# Patient Record
Sex: FEMALE | Race: BLACK OR AFRICAN AMERICAN | NOT HISPANIC OR LATINO | Employment: FULL TIME | URBAN - METROPOLITAN AREA
[De-identification: names, ages, dates, MRNs, and addresses within clinical notes are randomized per-mention and may not be internally consistent; named-entity substitution may affect disease eponyms.]

---

## 2023-02-07 ENCOUNTER — HOSPITAL ENCOUNTER (OUTPATIENT)
Dept: DATA CONVERSION | Facility: HOSPITAL | Age: 33
End: 2023-02-07
Attending: OBSTETRICS & GYNECOLOGY
Payer: COMMERCIAL

## 2023-02-07 DIAGNOSIS — Z72.0 TOBACCO USE: ICD-10-CM

## 2023-02-07 DIAGNOSIS — M54.2 CERVICALGIA: ICD-10-CM

## 2023-02-07 DIAGNOSIS — O99.891 OTHER SPECIFIED DISEASES AND CONDITIONS COMPLICATING PREGNANCY (HHS-HCC): ICD-10-CM

## 2023-02-07 DIAGNOSIS — R10.9 UNSPECIFIED ABDOMINAL PAIN: ICD-10-CM

## 2023-02-07 DIAGNOSIS — O26.892 OTHER SPECIFIED PREGNANCY RELATED CONDITIONS, SECOND TRIMESTER (HHS-HCC): ICD-10-CM
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
## 2023-02-07 DIAGNOSIS — Z3A.26 26 WEEKS GESTATION OF PREGNANCY (HHS-HCC): ICD-10-CM

## 2023-02-07 DIAGNOSIS — Z34.80 ENCOUNTER FOR SUPERVISION OF OTHER NORMAL PREGNANCY, UNSPECIFIED TRIMESTER (HHS-HCC): ICD-10-CM

## 2023-02-07 LAB
ABO GROUP (TYPE) IN BLOOD: NORMAL
ACTIVATED PARTIAL THROMBOPLASTIN TIME IN PPP BY COAGULATION ASSAY: 27 SEC (ref 26–39)
ANTIBODY SCREEN: NORMAL
ERYTHROCYTE DISTRIBUTION WIDTH (RATIO) BY AUTOMATED COUNT: 15.1 % (ref 11.5–14.5)
ERYTHROCYTE MEAN CORPUSCULAR HEMOGLOBIN CONCENTRATION (G/DL) BY AUTOMATED: 32.2 G/DL (ref 32–36)
ERYTHROCYTE MEAN CORPUSCULAR VOLUME (FL) BY AUTOMATED COUNT: 88 FL (ref 80–100)
ERYTHROCYTES (10*6/UL) IN BLOOD BY AUTOMATED COUNT: 4.14 X10E12/L (ref 4–5.2)
FIBRINOGEN (MG/DL) IN PPP BY COAGULATION ASSAY: 682 MG/DL (ref 200–400)
HEMATOCRIT (%) IN BLOOD BY AUTOMATED COUNT: 36.3 % (ref 36–46)
HEMOGLOBIN (G/DL) IN BLOOD: 11.7 G/DL (ref 12–16)
INR IN PPP BY COAGULATION ASSAY: 0.9 (ref 0.9–1.1)
LEUKOCYTES (10*3/UL) IN BLOOD BY AUTOMATED COUNT: 8.9 X10E9/L (ref 4.4–11.3)
NRBC (PER 100 WBCS) BY AUTOMATED COUNT: 0 /100 WBC (ref 0–0)
PLATELETS (10*3/UL) IN BLOOD AUTOMATED COUNT: 294 X10E9/L (ref 150–450)
PROTHROMBIN TIME (PT) IN PPP BY COAGULATION ASSAY: 10.7 SEC (ref 9.8–13.4)
RH FACTOR: NORMAL

## 2023-02-08 LAB — Lab: 0 %

## 2023-02-28 LAB
ERYTHROCYTE DISTRIBUTION WIDTH (RATIO) BY AUTOMATED COUNT: 14.9 % (ref 11.5–14.5)
ERYTHROCYTE MEAN CORPUSCULAR HEMOGLOBIN CONCENTRATION (G/DL) BY AUTOMATED: 32.1 G/DL (ref 32–36)
ERYTHROCYTE MEAN CORPUSCULAR VOLUME (FL) BY AUTOMATED COUNT: 89 FL (ref 80–100)
ERYTHROCYTES (10*6/UL) IN BLOOD BY AUTOMATED COUNT: 3.82 X10E12/L (ref 4–5.2)
FERRITIN, PREGNANCY: 27 UG/L
FOLATE, SERUM, PREGNANCY: 12.6 NG/ML
GLUCOSE, 1 HR SCREEN, PREG: 202 MG/DL
HEMATOCRIT (%) IN BLOOD BY AUTOMATED COUNT: 34 % (ref 36–46)
HEMOGLOBIN (G/DL) IN BLOOD: 10.9 G/DL (ref 12–16)
IRON (UG/DL) IN SER/PLAS IN PREGNANCY: 68 UG/DL
IRON BINDING CAPACITY (UG/DL) IN PREGNANCY: 393 UG/DL
IRON SATURATION (%) IN PREGNANCY: 17 %
LEUKOCYTES (10*3/UL) IN BLOOD BY AUTOMATED COUNT: 8.2 X10E9/L (ref 4.4–11.3)
NRBC (PER 100 WBCS) BY AUTOMATED COUNT: 0 /100 WBC (ref 0–0)
PLATELETS (10*3/UL) IN BLOOD AUTOMATED COUNT: 274 X10E9/L (ref 150–450)
REFLEX ADDED, ANEMIA PANEL: ABNORMAL
SYPHILIS TOTAL AB: NONREACTIVE
VITAMIN B12, PREGNANCY: 368 PG/ML
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
## 2023-03-23 LAB
CLUE CELLS: ABNORMAL
NUGENT SCORE: 1
YEAST: PRESENT

## 2023-03-24 LAB
CHLAMYDIA TRACH., AMPLIFIED: NEGATIVE
N. GONORRHEA, AMPLIFIED: NEGATIVE
TRICHOMONAS VAGINALIS: NEGATIVE

## 2023-03-29 ENCOUNTER — HOSPITAL ENCOUNTER (OUTPATIENT)
Dept: DATA CONVERSION | Facility: HOSPITAL | Age: 33
End: 2023-03-29
Attending: OBSTETRICS & GYNECOLOGY
Payer: COMMERCIAL

## 2023-03-29 DIAGNOSIS — O24.414 GESTATIONAL DIABETES MELLITUS IN PREGNANCY, INSULIN CONTROLLED (HHS-HCC): ICD-10-CM

## 2023-03-29 DIAGNOSIS — O13.3 GESTATIONAL (PREGNANCY-INDUCED) HYPERTENSION WITHOUT SIGNIFICANT PROTEINURIA, THIRD TRIMESTER (HHS-HCC): ICD-10-CM

## 2023-03-29 DIAGNOSIS — Z34.80 ENCOUNTER FOR SUPERVISION OF OTHER NORMAL PREGNANCY, UNSPECIFIED TRIMESTER (HHS-HCC): ICD-10-CM

## 2023-03-29 DIAGNOSIS — O35.BXX0: ICD-10-CM

## 2023-03-29 DIAGNOSIS — E28.2 POLYCYSTIC OVARIAN SYNDROME: ICD-10-CM

## 2023-03-29 DIAGNOSIS — O99.283 ENDOCRINE, NUTRITIONAL AND METABOLIC DISEASES COMPLICATING PREGNANCY, THIRD TRIMESTER (HHS-HCC): ICD-10-CM

## 2023-03-29 DIAGNOSIS — Z79.4 LONG TERM (CURRENT) USE OF INSULIN (MULTI): ICD-10-CM

## 2023-03-29 DIAGNOSIS — O35.8XX0 MATERNAL CARE FOR OTHER (SUSPECTED) FETAL ABNORMALITY AND DAMAGE, NOT APPLICABLE OR UNSPECIFIED (HHS-HCC): ICD-10-CM

## 2023-03-29 DIAGNOSIS — Z87.891 PERSONAL HISTORY OF NICOTINE DEPENDENCE: ICD-10-CM

## 2023-03-29 DIAGNOSIS — Z79.82 LONG TERM (CURRENT) USE OF ASPIRIN: ICD-10-CM

## 2023-03-29 DIAGNOSIS — Z3A.33 33 WEEKS GESTATION OF PREGNANCY (HHS-HCC): ICD-10-CM

## 2023-03-29 LAB — POCT GLUCOSE: 91 MG/DL (ref 74–99)

## 2023-04-23 LAB — GROUP B STREP SCREEN: NORMAL

## 2023-04-29 LAB — SARS-COV-2 RESULT: NOT DETECTED

## 2023-05-09 ENCOUNTER — HOSPITAL ENCOUNTER (OUTPATIENT)
Dept: DATA CONVERSION | Facility: HOSPITAL | Age: 33
End: 2023-05-09
Attending: OBSTETRICS & GYNECOLOGY
Payer: COMMERCIAL

## 2023-05-09 DIAGNOSIS — Z72.0 TOBACCO USE: ICD-10-CM

## 2023-05-09 DIAGNOSIS — E66.9 OBESITY, UNSPECIFIED: ICD-10-CM

## 2023-05-09 DIAGNOSIS — R04.2 HEMOPTYSIS: ICD-10-CM

## 2023-05-09 DIAGNOSIS — R07.89 OTHER CHEST PAIN: ICD-10-CM

## 2023-05-09 DIAGNOSIS — E28.2 POLYCYSTIC OVARIAN SYNDROME: ICD-10-CM

## 2023-05-09 LAB
ACTIVATED PARTIAL THROMBOPLASTIN TIME IN PPP BY COAGULATION ASSAY: 29 SEC (ref 26–39)
ALANINE AMINOTRANSFERASE (SGPT) (U/L) IN SER/PLAS: 21 U/L (ref 7–45)
ALBUMIN (G/DL) IN SER/PLAS: 3.3 G/DL (ref 3.4–5)
ALKALINE PHOSPHATASE (U/L) IN SER/PLAS: 72 U/L (ref 33–110)
ANION GAP IN SER/PLAS: 14 MMOL/L (ref 10–20)
ASPARTATE AMINOTRANSFERASE (SGOT) (U/L) IN SER/PLAS: 12 U/L (ref 9–39)
BILIRUBIN TOTAL (MG/DL) IN SER/PLAS: 0.3 MG/DL (ref 0–1.2)
CALCIUM (MG/DL) IN SER/PLAS: 8.7 MG/DL (ref 8.6–10.6)
CARBON DIOXIDE, TOTAL (MMOL/L) IN SER/PLAS: 26 MMOL/L (ref 21–32)
CHLORIDE (MMOL/L) IN SER/PLAS: 106 MMOL/L (ref 98–107)
CREATININE (MG/DL) IN SER/PLAS: 0.96 MG/DL (ref 0.5–1.05)
ERYTHROCYTE DISTRIBUTION WIDTH (RATIO) BY AUTOMATED COUNT: 14.7 % (ref 11.5–14.5)
ERYTHROCYTE MEAN CORPUSCULAR HEMOGLOBIN CONCENTRATION (G/DL) BY AUTOMATED: 31.6 G/DL (ref 32–36)
ERYTHROCYTE MEAN CORPUSCULAR VOLUME (FL) BY AUTOMATED COUNT: 90 FL (ref 80–100)
ERYTHROCYTES (10*6/UL) IN BLOOD BY AUTOMATED COUNT: 4.14 X10E12/L (ref 4–5.2)
GFR FEMALE: 81 ML/MIN/1.73M2
GLUCOSE (MG/DL) IN SER/PLAS: 79 MG/DL (ref 74–99)
HEMATOCRIT (%) IN BLOOD BY AUTOMATED COUNT: 37.3 % (ref 36–46)
HEMOGLOBIN (G/DL) IN BLOOD: 11.8 G/DL (ref 12–16)
INR IN PPP BY COAGULATION ASSAY: 1 (ref 0.9–1.1)
LEUKOCYTES (10*3/UL) IN BLOOD BY AUTOMATED COUNT: 4.9 X10E9/L (ref 4.4–11.3)
NATRIURETIC PEPTIDE B (PG/ML) IN SER/PLAS: 123 PG/ML (ref 0–99)
NRBC (PER 100 WBCS) BY AUTOMATED COUNT: 0 /100 WBC (ref 0–0)
PLATELETS (10*3/UL) IN BLOOD AUTOMATED COUNT: 397 X10E9/L (ref 150–450)
POTASSIUM (MMOL/L) IN SER/PLAS: 4.3 MMOL/L (ref 3.5–5.3)
PROTEIN TOTAL: 6.3 G/DL (ref 6.4–8.2)
PROTHROMBIN TIME (PT) IN PPP BY COAGULATION ASSAY: 11.3 SEC (ref 9.8–13.4)
SODIUM (MMOL/L) IN SER/PLAS: 142 MMOL/L (ref 136–145)
TROPONIN I, HIGH SENSITIVITY: 3 NG/L (ref 0–34)
UREA NITROGEN (MG/DL) IN SER/PLAS: 16 MG/DL (ref 6–23)

## 2023-05-11 LAB
ATRIAL RATE: 54 BPM
P AXIS: 26 DEGREES
P OFFSET: 201 MS
P ONSET: 147 MS
PR INTERVAL: 148 MS
Q ONSET: 221 MS
QRS COUNT: 9 BEATS
QRS DURATION: 80 MS
QT INTERVAL: 418 MS
QTC CALCULATION(BAZETT): 396 MS
QTC FREDERICIA: 403 MS
R AXIS: -24 DEGREES
T AXIS: -1 DEGREES
T OFFSET: 430 MS
VENTRICULAR RATE: 54 BPM

## 2023-08-30 LAB — CHORIOGONADOTROPIN (MIU/ML) IN SER/PLAS: <3 IU/L

## 2023-08-31 LAB
CHLAMYDIA TRACH., AMPLIFIED: NEGATIVE
CLUE CELLS: NORMAL
N. GONORRHEA, AMPLIFIED: NEGATIVE
NUGENT SCORE: 1
YEAST: NORMAL

## 2023-09-06 VITALS
HEIGHT: 65 IN | WEIGHT: 260.14 LBS | BODY MASS INDEX: 43.34 KG/M2 | OXYGEN SATURATION: 100 % | HEART RATE: 91 BPM | DIASTOLIC BLOOD PRESSURE: 86 MMHG | RESPIRATION RATE: 17 BRPM | SYSTOLIC BLOOD PRESSURE: 123 MMHG | TEMPERATURE: 96.8 F

## 2023-09-07 VITALS — HEIGHT: 64 IN | WEIGHT: 260.14 LBS | BODY MASS INDEX: 44.41 KG/M2
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
## 2023-09-14 NOTE — PROGRESS NOTES
Current Stage:   Stage: Post-partum     Subjective Data:   Post Partum:  Ambulate: Yes   Flatus: Yes   Tolerate Diet: Yes   Lochia: Moderate   Postpartum:    Patient is a 33yo  7 days post  presenting with concern for hemoptysis and chest tightness.  Patient endorses pressure-like sensation in her midsternal  region since Saturday without obvious provocation, without radiation, without associated nausea, vomiting or diaphoresis.  Patient does endorse that it  feels a little like shortness of breath but endorses does not particularly worse with exertion or  when she takes a deep breath.  Patient additionally endorsing a single episode of bright red blood that she coughed up prior to presentation today.  Per picture patient shows me, volume was approximately 1 teaspoon.  Patient otherwise does not endorse  any other recent hemoptysis, denies sick contacts, denies fevers, malaise, flulike symptoms but does endorse some chills in the postpartum period.  Patient does endorse some bilateral lower extremity swelling that she thinks is worse on the right but  does not endorses particular tightness or pain in extremities.  Patient otherwise does not endorses headache, vision changes, or abdominal pain.      Pregnancy notable for:   - GDMA2, on insulin NPH   - Smoking cessation during pregnancy, was smoking 1-2 cigarettes/day, now about 1 q3 weeks  - Obesity, class III    OBHx:  2010  @ 40wga, 8#5  2011 TAB @ 19wga, twin gestation  2023  @ 38.2, 2.98kg  GynHx: denies h/o STIs, last Pap 3/2022 NILM, HPV neg  PMH: PCOS  PSH: D&E   Meds: acetaminophen  All: PCN  SocHx: 10year history of 2-3cigs/day prior to pregnancy,  no alcohol or recreational drug use      Objective Information:    Objective Information:      T   P  R  BP   MAP  SpO2   Value  36.4  59  18  138/69   94     Date/Time  15:36  15:36  15:36  15:36   15:36    Range  (36.4C - 36.4C )  (59 - 59 )  (18 - 18 )  (138  - 138 )/ (69 - 69 )  (94 - 94 )        Physical Exam:   Constitutional: well appearing, no apparent distress   ENMT: No visible blood in oropharynx   Head/Neck: NCAT   Respiratory/Thorax: Clear to auscultation bilaterally,  no increased work of breathing, satting 99% on room air.   Cardiovascular: Borderline bradycardic to 58 bpm,  2+ radial and PT pulses.  Regular rhythm.   Gastrointestinal: Appropriately post gravid/obese,  no tenderness to palpation.   Extremities: Bilateral pitting edema to upper calves.   Symmetric bilaterally, no tenderness to palpation, no appreciable tightness.   Neurological: Strength and sensation grossly intact.   Psychological: Appropriate mood and affect.     Assessment and Plan:   Assessment:    Patient is a 31yo  7 days post  presenting with concern for hemoptysis and chest tightness.  Given hemoptysis and chest tightness in postpartum period,  patient was evaluated with CT PE that was without acute embolus.  Patient otherwise with no infectious symptoms, no CT findings, overall well-appearing reducing concern for infectious causes of hemoptysis.  Patient otherwise with stable hemoglobin, no  evidence of continued hemoptysis and overall impression that hemoptysis may be secondary to bleed from other source versus mild tracheal irritation.  Patient additionally evaluated for cardiac causes of chest tightness with benign EKG, troponin of 3,   reducing concern for ACS or cardiac cause of symptoms.  Patient otherwise with no history of AVM or coagulopathy and coags here were within normal limits. Patient reevaluated with improvement in tightness, no respiratory distress and overall stable  appearance for follow-up outpatient.  Return precautions were discussed with patient, patient was confirmed to have follow-up with Dr. Perkins on Friday, and patient discharged home.      Patient seen and discussed with Dr. Kamari Norman MD, PhD  Emergency Medicine  PGY1      Attestation:   Note Completion:  I am a:  Resident/Fellow   Attending Attestation I saw and evaluated the patient.  I personally obtained the key and critical portions of the history and physical exam or was physically present for key and  critical portions performed by the resident/fellow. I reviewed the resident/fellow?s documentation and discussed the patient with the resident/fellow.  I agree with the resident/fellow?s medical decision making as documented in the note.     I personally evaluated the patient on 09-May-2023         Electronic Signatures:  Pj Norman (Resident))  (Signed 09-May-2023 19:39)   Authored: Current Stage, Subjective Data, Objective Data,  Assessment and Plan, Note Completion  Bala Benites)  (Signed 10-May-2023 06:55)   Authored: Note Completion   Co-Signer: Assessment and Plan, Note Completion      Last Updated: 10-May-2023 06:55 by Bala Benites)

## 2023-09-14 NOTE — PROGRESS NOTES
Current Stage:   Stage: Triage     Subjective Data:   Antepartum:  Vaginal Bleeding: No   Contractions/Abdominal Pain: No   Discharge/Loss of Fluid: No   Fetal Movement: Good   Fevers/Chills: No   Preeclampsia Symptoms: No   Antepartum:    31yo  at 33.3 wga by 11.2wk U/S who presents from the office for nrNST. Weekly NSTs d/t GDMA2. She is on insulin but missed dose this morning. Has not eaten anything today.  Reports good FM. Denies VB, LOF, or ctxs.    Pregnancy notable for:  - GDMA2 on Novolin 10 units daily   - last growth 3/6 EFW 1575 g 48%, AC 54%  - Tobacco use during pregnancy, quit 1/3  - fetal left pylectasis  - BMI 43.3    ObHx-    - gHTN  EAB   GynHx - h/o abnormal PAP-  ASCUS with neg HPV, repeat 3/2022- WNL, hx/o STIs and treated  MedHx - PCOS   SurgHx - denies   Fam Hx - non contributory  SocHx - stopped smoking tobacco 1/3/23, denies ETOH, denies illicit drugs   Meds - bASA (forgets often), Novolin   All - Penicillin (not tested, was told as a child)        Objective Information:    Objective Information:      T   P  R  BP   MAP  SpO2   Value  36.6  94  17  119/78   94  99%  Date/Time 3/29 11:06 3/29 11:06 3/29 11:06 3/29 11:06  3/29 11:06 3/29 11:05  Range  (36.6C - 36.6C )  (87 - 94 )  (17 - 17 )  (119 - 119 )/ (78 - 78 )  (94 - 94 )  (99% - 99% )      Pain reported at 3/29 11:06: 0 = None      Physical Exam:   Constitutional: alert, oriented   Obstetric: 135, mod variability, +accels, -decels  toco: mild irritability, abdomen soft   Head/Neck: neck supple, no thyromegaly   Respiratory/Thorax: normal respiratory effort   Gastrointestinal: soft, gravid, non-tender   Extremities: no calf tenderness or edema   Neurological: no deficits   Psychological: appropriate affect   Skin: no rashes or lesions      Testing:   NST Interpretation - Baby A:  ·  Baseline    ·  Variability moderate (amplitude range 6 to 25 bpm)   ·  Interpretation Reactive (2 15x15 accels)   ·   Accelerations +   ·  Decelerations -     Assessment and Plan:   Assessment:    31yo  at 33.3 wga by 11.2wk U/S who presents from the office for nrNST. Weekly NSTs d/t GDMA2. She is on insulin but missed dose this morning. Has not eaten anything today.  Reports good FM. Denies VB, LOF, or ctxs.    Prolonged monitoring  - sent to triage for nrNST in office  - reactive NST in triage today  - pt reports good FM  - continue twice weekly  testing     GDMA2  - POC BG 91  - missed insulin this morning, advised to take when she returns home    Maternal Well-being  - Vital signs stable and WNL  - Emotional support and reassurance provided  - All questions and concerns addressed    Dispo  - d/c to home, pt comfortable with this  - f/u 4/3 as scheduled with primary OB    Plan and tracing discussed with Dr. Chan who reviewed tracing and agrees with d/c home.    Shaye Hackett PA-C  Doc [employer]/Vocera        Plan of Care Reviewed With:  Plan of Care Reviewed With: patient     Attestation:   Note Completion:  I am a:  Advanced Practice Provider   Attending Only - Shared Visit with Advanced Practice Provider This is a shared visit.  I have reviewed the Advanced Practice Provider?s encounter note, approve the Advanced Practice Provider?s documentation,  and provide the following additional information from my personal encounter.    Comments/ Additional Findings    Patient seen. Agree with assessment and plan as documented.     Cassandra Chan MD  OB/GYN Attending Physician          Electronic Signatures:  Cassandra Chan)  (Signed 29-Mar-2023 13:36)   Authored: Note Completion   Co-Signer: Current Stage, Subjective Data, Objective Data,  Testing, Assessment and Plan, Note Completion  Shaye Hackett (PAC)  (Signed 29-Mar-2023 13:33)   Authored: Current Stage, Subjective Data, Objective Data,   Testing, Assessment and Plan, Note Completion      Last Updated: 29-Mar-2023 13:36 by Cassandra Chan)

## 2024-03-14 ENCOUNTER — OFFICE VISIT (OUTPATIENT)
Dept: OBSTETRICS AND GYNECOLOGY | Facility: CLINIC | Age: 34
End: 2024-03-14
Payer: COMMERCIAL

## 2024-03-14 VITALS
DIASTOLIC BLOOD PRESSURE: 100 MMHG | BODY MASS INDEX: 43.36 KG/M2 | WEIGHT: 254 LBS | HEIGHT: 64 IN | SYSTOLIC BLOOD PRESSURE: 120 MMHG

## 2024-03-14 DIAGNOSIS — L72.3 SEBACEOUS CYST OF RIGHT AXILLA: ICD-10-CM

## 2024-03-14 DIAGNOSIS — N94.6 MENSES PAINFUL: ICD-10-CM

## 2024-03-14 DIAGNOSIS — Z30.432 ENCOUNTER FOR IUD REMOVAL: ICD-10-CM

## 2024-03-14 DIAGNOSIS — Z11.3 SCREENING FOR STD (SEXUALLY TRANSMITTED DISEASE): ICD-10-CM

## 2024-03-14 DIAGNOSIS — Z75.8 DOES NOT HAVE PRIMARY CARE PROVIDER: ICD-10-CM

## 2024-03-14 DIAGNOSIS — Z01.419 WOMEN'S ANNUAL ROUTINE GYNECOLOGICAL EXAMINATION: Primary | ICD-10-CM

## 2024-03-14 DIAGNOSIS — R51.9 FREQUENT HEADACHES: ICD-10-CM

## 2024-03-14 PROCEDURE — 87205 SMEAR GRAM STAIN: CPT

## 2024-03-14 PROCEDURE — 1036F TOBACCO NON-USER: CPT

## 2024-03-14 PROCEDURE — 87800 DETECT AGNT MULT DNA DIREC: CPT

## 2024-03-14 PROCEDURE — 99395 PREV VISIT EST AGE 18-39: CPT

## 2024-03-14 PROCEDURE — 87661 TRICHOMONAS VAGINALIS AMPLIF: CPT

## 2024-03-14 RX ORDER — PHENYLPROPANOLAMINE/CLEMASTINE 75-1.34MG
200 TABLET, EXTENDED RELEASE ORAL EVERY 6 HOURS PRN
Qty: 120 CAPSULE | Refills: 0 | Status: SHIPPED | OUTPATIENT
Start: 2024-03-14

## 2024-03-14 ASSESSMENT — ENCOUNTER SYMPTOMS
PSYCHIATRIC NEGATIVE: 0
EYES NEGATIVE: 0
MUSCULOSKELETAL NEGATIVE: 0
NEUROLOGICAL NEGATIVE: 0
HEADACHES: 1
ALLERGIC/IMMUNOLOGIC NEGATIVE: 0
GASTROINTESTINAL NEGATIVE: 0
ENDOCRINE NEGATIVE: 0
CONSTITUTIONAL NEGATIVE: 0
HEMATOLOGIC/LYMPHATIC NEGATIVE: 0
RESPIRATORY NEGATIVE: 0
CARDIOVASCULAR NEGATIVE: 0

## 2024-03-14 ASSESSMENT — PAIN SCALES - GENERAL: PAINLEVEL: 0-NO PAIN

## 2024-03-14 NOTE — PROGRESS NOTES
Assessment/Plan   Diagnoses and all orders for this visit:  Women's annual routine gynecological examination  Encounter for IUD removal  Does not have primary care provider  -     Referral to Primary Care; Future  Screening for STD (sexually transmitted disease)  -     Vaginitis Gram Stain For Bacterial Vaginosis + Yeast  -     Trichomonas vaginalis, Amplified; Future  -     C. trachomatis + N. gonorrhoeae, Amplified; Future  -     Hepatitis B Surface Antibody; Future  -     Hepatitis B Surface Antigen; Future  -     Hepatitis C Antibody; Future  -     HIV 1/2 Antigen/Antibody Screen with Reflex to Confirmation; Future  -     Syphilis Screen with Reflex; Future  Menses painful  -     ibuprofen (Motrin) capsule; Take 1 capsule (200 mg) by mouth every 6 hours if needed (menstrual cramps.).  Sebaceous cyst of right axilla  -     Referral to Dermatology  Frequent headaches  -     Referral to Neurology; Future    Encouraged patient to follow up with PCP for hypertension and general health maintenance. Education provided regarding specialties in healthcare and the importance of PCP.   Encouraged patient to increase hydration and reviewed NSAID dosing and timing.   Pap Due 2027.   Discussed medication management for prolonged periods including POP. Patient declined at this time.   Encouraged to reach out to our office with any questions or concerns.   Encouraged patient to follow up annually or PRN    CODEY Barnett-CARO     Subjective   Jesusita Her is a 33 y.o. female who is here for a routine exam.     Concerns today:  IUD  Patient had IUD placed in September, would like it removed at this time. She reports that her partner is currently incarcerated and she does not feel the need for birth control at this time.     Prolonged periods  Patient reports regular monthly periods that are painful and last 2 weeks at a time. She reports a previous histroy of PCOS diagnosed at . Patient is requesting ibuprofen  prescription at this time.     Headaches  Patient reports frequent painful headaches lasting 3 days at a time that are debilitating.  Reports that she does not take any medication to help the headaches.   Patient reports inadequate hydration     Armpit Cyst  Patient reports concern for cyst in her right armpit.     STD Testing  Patient is requesting STD testing at this time.     No LMP recorded. Patient has had an implant.   Periods are irregular, lasting  2 weeks  patient reports that the periods are light but enough to wear a pad for 2 weeks.    Dysmenorrhea: mild, occurring throughout menses.   Cyclic symptoms include none.     Sexual Activity: not currently sexually active, male partners; Patient reports 1 partners in the last 12 months.  Pain with intercourse? No   Loss of desire? No   Able to have an orgasm? yes    History of prior STI: chlamydia and trichomonas  Current contraception: IUD  Last pap: 3/11/2022  History of abnormal Pap smear: yes - ascus in 2018  Family history of uterine or ovarian cancer: no  Last mammogram: no  History of abnormal mammogram: no  Family history of breast cancer: no  Past Medical History:   Diagnosis Date    Elevated blood-pressure reading, without diagnosis of hypertension 11/24/2021    Elevated blood pressure reading    Encounter for screening for infections with a predominantly sexual mode of transmission 08/07/2020    Routine screening for STI (sexually transmitted infection)    Encounter for screening for malignant neoplasm of cervix     Screening for cervical cancer    Furuncle, unspecified 03/11/2022    Boil    Hypertrophy of tonsils with hypertrophy of adenoids 04/17/2018    Tonsillar and adenoid hypertrophy    Irregular menstruation, unspecified 06/03/2020    Missed menses    Migraine, unspecified, not intractable, without status migrainosus 06/24/2020    Migraines    Nicotine dependence, cigarettes, uncomplicated 11/24/2021    Cigarette nicotine dependence without  "complication    Personal history of other diseases of the circulatory system     History of hypertension    Personal history of other diseases of the female genital tract 2020    History of amenorrhea    Personal history of other diseases of the respiratory system 2019    History of tonsillitis    Personal history of other infectious and parasitic diseases 2018    History of trichomoniasis    Personal history of other infectious and parasitic diseases     History of chlamydia infection    Personal history of other medical treatment     History of positive purified protein derivative test    Personal history of other specified conditions     History of snoring    Personal history of other specified conditions 2020    History of dysuria    Procedure and treatment not carried out for other reasons 2020    Attempted IUD removal, unsuccessful    Unspecified complication of genitourinary prosthetic device, implant and graft, initial encounter (CMS/[facility]) 2020    IUD complication      Past Surgical History:   Procedure Laterality Date    OTHER SURGICAL HISTORY  2018    Surgical Treatment For         Review of Systems   Genitourinary:  Positive for menstrual problem.   Neurological:  Positive for headaches.   All other systems reviewed and are negative.     Menstrual History:  OB History          3    Para   2    Term   2            AB   1    Living   2         SAB        IAB   1    Ectopic        Multiple        Live Births   2                Menarche age: 14  No LMP recorded. Patient has had an implant.       Objective   BP (!) 120/100 (BP Location: Right arm, Patient Position: Sitting, BP Cuff Size: Adult)   Ht 1.626 m (5' 4\")   Wt 115 kg (254 lb)   BMI 43.60 kg/m²     General:   Alert and oriented x 3   Heart:  Thyroid: Regular rate, rhythm  Euthyroid, normal shape and size   Lungs:  Breast: Clear to auscultation bilaterally  Symmetrical, no skin " changes/nipple discharge, redness, tenderness, no masses palpated bilaterally. Sebaceous cyst present in right axilla.    Abdomen: Soft, non tender   Vulva: EGBUS normal   Vagina: Pink, normal discharge   Cervix: No CMT. IUD strings visualized   Uterus: Normal shape, size   Adnexa: NT bilaterally     IUD Removal    Date/Time: 3/19/2024 11:35 AM    Performed by: BRI Barnett  Authorized by: BRI Barnett    Consent:     Consent obtained:  Written    Consent given by:  Patient    Procedure risks and benefits discussed: yes      Patient questions answered: yes      Patient agrees, verbalizes understanding, and wants to proceed: yes      Instructions and paperwork completed: yes    Universal protocol:     Patient states understanding of procedure being performed: yes    Procedure:     Removed with no complications: yes      Removal due to mechanical complications of IUD: no      Removal due to infection and inflammatory reaction: no

## 2024-03-15 ENCOUNTER — TELEPHONE (OUTPATIENT)
Dept: OBSTETRICS AND GYNECOLOGY | Facility: CLINIC | Age: 34
End: 2024-03-15
Payer: COMMERCIAL

## 2024-03-15 DIAGNOSIS — N76.0 BV (BACTERIAL VAGINOSIS): Primary | ICD-10-CM

## 2024-03-15 DIAGNOSIS — B96.89 BV (BACTERIAL VAGINOSIS): Primary | ICD-10-CM

## 2024-03-15 LAB
C TRACH RRNA SPEC QL NAA+PROBE: NEGATIVE
CLUE CELLS VAG LPF-#/AREA: PRESENT /[LPF]
N GONORRHOEA DNA SPEC QL PROBE+SIG AMP: NEGATIVE
NUGENT SCORE: 9
T VAGINALIS RRNA SPEC QL NAA+PROBE: NEGATIVE
YEAST VAG WET PREP-#/AREA: ABNORMAL

## 2024-03-15 RX ORDER — METRONIDAZOLE 500 MG/1
500 TABLET ORAL 2 TIMES DAILY
Qty: 14 TABLET | Refills: 0 | Status: SHIPPED | OUTPATIENT
Start: 2024-03-15 | End: 2024-03-22

## 2024-03-15 NOTE — TELEPHONE ENCOUNTER
Called patient to discuss results.  Identified by name and .  Informed patient of her results and medication that has been sent to pharmacy for treatment.  Patient verbalized understanding, no questions or concerns at this time.    MICHAEL Gonzalez RN        ----- Message from BRI Barnett sent at 3/15/2024  8:59 AM EDT -----  This patient tested positive for BV. I have placed the order for flagyl. Can someone call and let her know? Thanks!     Let me know if there are any questions.   BRI Barnett

## 2024-03-19 PROCEDURE — 58301 REMOVE INTRAUTERINE DEVICE: CPT

## 2024-06-05 ENCOUNTER — TELEMEDICINE (OUTPATIENT)
Dept: NEUROLOGY | Facility: CLINIC | Age: 34
End: 2024-06-05
Payer: COMMERCIAL

## 2024-06-05 DIAGNOSIS — R51.9 FREQUENT HEADACHES: ICD-10-CM

## 2024-06-05 DIAGNOSIS — G44.221 CHRONIC TENSION-TYPE HEADACHE, INTRACTABLE: Primary | ICD-10-CM

## 2024-06-05 PROCEDURE — 99204 OFFICE O/P NEW MOD 45 MIN: CPT | Performed by: NURSE PRACTITIONER

## 2024-06-05 RX ORDER — METOPROLOL TARTRATE 25 MG/1
25 TABLET, FILM COATED ORAL DAILY
Qty: 30 TABLET | Refills: 1 | Status: SHIPPED | OUTPATIENT
Start: 2024-06-05

## 2024-06-05 RX ORDER — SUMATRIPTAN SUCCINATE 100 MG/1
100 TABLET ORAL ONCE AS NEEDED
Qty: 9 TABLET | Refills: 1 | Status: SHIPPED | OUTPATIENT
Start: 2024-06-05

## 2024-06-05 NOTE — PROGRESS NOTES
Patient being assessed today for initial evaluation of headaches.  She reports that she has suffered from headaches since about 5 years ago after getting an IUD and now they just are becoming longer in duration.  She states that it is an ache that she experiences across her forehead.  This occurs on average of 1 time per week and can last anywhere from 1 day to 4 days.  Denies nausea.  Denies vomiting.  Denies photophobia.  Denies phonophobia.  Denies vision changes or aura.  Endorses occasional dizziness.  Denies speech and language deficits.  Would like to try her on metoprolol 25 mg nightly.  Will give her sumatriptan for abortive treatment as well although I believe this is more of a tension headache versus migraine.  Patient has been over utilizing over-the-counter's which may be contributing to it as well.  Discussed role of medicine, importance of taking medications, potential risks, benefits, and precautions to be taken.  Reviewed sleep hygiene and dietary modifications.  Follow-up in 6 to 8 weeks.    This note was created with voice recognition software and was not corrected for typographical or grammatical errors

## 2024-06-20 ENCOUNTER — APPOINTMENT (OUTPATIENT)
Dept: PRIMARY CARE | Facility: CLINIC | Age: 34
End: 2024-06-20
Payer: COMMERCIAL

## 2024-06-27 ENCOUNTER — APPOINTMENT (OUTPATIENT)
Dept: DERMATOLOGY | Facility: CLINIC | Age: 34
End: 2024-06-27
Payer: COMMERCIAL

## 2024-07-15 ENCOUNTER — APPOINTMENT (OUTPATIENT)
Dept: PRIMARY CARE | Facility: CLINIC | Age: 34
End: 2024-07-15
Payer: COMMERCIAL

## 2024-07-23 ENCOUNTER — APPOINTMENT (OUTPATIENT)
Dept: PRIMARY CARE | Facility: CLINIC | Age: 34
End: 2024-07-23
Payer: COMMERCIAL

## 2024-07-23 ENCOUNTER — LAB (OUTPATIENT)
Dept: LAB | Facility: LAB | Age: 34
End: 2024-07-23
Payer: COMMERCIAL

## 2024-07-23 VITALS
HEIGHT: 65 IN | HEART RATE: 73 BPM | SYSTOLIC BLOOD PRESSURE: 120 MMHG | WEIGHT: 251 LBS | TEMPERATURE: 97.8 F | DIASTOLIC BLOOD PRESSURE: 83 MMHG | BODY MASS INDEX: 41.82 KG/M2

## 2024-07-23 DIAGNOSIS — G43.001 MIGRAINE WITHOUT AURA AND WITH STATUS MIGRAINOSUS, NOT INTRACTABLE: ICD-10-CM

## 2024-07-23 DIAGNOSIS — I10 PRIMARY HYPERTENSION: ICD-10-CM

## 2024-07-23 DIAGNOSIS — L73.2 HIDRADENITIS SUPPURATIVA OF RIGHT AXILLA: ICD-10-CM

## 2024-07-23 DIAGNOSIS — Z76.89 ENCOUNTER TO ESTABLISH CARE: Primary | ICD-10-CM

## 2024-07-23 LAB
ALBUMIN SERPL BCP-MCNC: 3.9 G/DL (ref 3.4–5)
ALP SERPL-CCNC: 70 U/L (ref 33–110)
ALT SERPL W P-5'-P-CCNC: 17 U/L (ref 7–45)
ANION GAP SERPL CALC-SCNC: 10 MMOL/L (ref 10–20)
AST SERPL W P-5'-P-CCNC: 14 U/L (ref 9–39)
BILIRUB SERPL-MCNC: 0.4 MG/DL (ref 0–1.2)
BUN SERPL-MCNC: 16 MG/DL (ref 6–23)
CALCIUM SERPL-MCNC: 8.8 MG/DL (ref 8.6–10.6)
CHLORIDE SERPL-SCNC: 104 MMOL/L (ref 98–107)
CHOLEST SERPL-MCNC: 158 MG/DL (ref 0–199)
CHOLESTEROL/HDL RATIO: 3.3
CO2 SERPL-SCNC: 27 MMOL/L (ref 21–32)
CREAT SERPL-MCNC: 0.76 MG/DL (ref 0.5–1.05)
EGFRCR SERPLBLD CKD-EPI 2021: >90 ML/MIN/1.73M*2
ERYTHROCYTE [DISTWIDTH] IN BLOOD BY AUTOMATED COUNT: 14.1 % (ref 11.5–14.5)
EST. AVERAGE GLUCOSE BLD GHB EST-MCNC: 105 MG/DL
GLUCOSE SERPL-MCNC: 97 MG/DL (ref 74–99)
HBA1C MFR BLD: 5.3 %
HCT VFR BLD AUTO: 35.8 % (ref 36–46)
HDLC SERPL-MCNC: 48.3 MG/DL
HGB BLD-MCNC: 11.5 G/DL (ref 12–16)
LDLC SERPL CALC-MCNC: 96 MG/DL
MCH RBC QN AUTO: 25.8 PG (ref 26–34)
MCHC RBC AUTO-ENTMCNC: 32.1 G/DL (ref 32–36)
MCV RBC AUTO: 80 FL (ref 80–100)
NON HDL CHOLESTEROL: 110 MG/DL (ref 0–149)
NRBC BLD-RTO: 0 /100 WBCS (ref 0–0)
PLATELET # BLD AUTO: 271 X10*3/UL (ref 150–450)
POTASSIUM SERPL-SCNC: 4.2 MMOL/L (ref 3.5–5.3)
PROT SERPL-MCNC: 7.1 G/DL (ref 6.4–8.2)
RBC # BLD AUTO: 4.45 X10*6/UL (ref 4–5.2)
SODIUM SERPL-SCNC: 137 MMOL/L (ref 136–145)
TRIGL SERPL-MCNC: 71 MG/DL (ref 0–149)
TSH SERPL-ACNC: 1.1 MIU/L (ref 0.44–3.98)
VLDL: 14 MG/DL (ref 0–40)
WBC # BLD AUTO: 3 X10*3/UL (ref 4.4–11.3)

## 2024-07-23 PROCEDURE — 3074F SYST BP LT 130 MM HG: CPT | Performed by: INTERNAL MEDICINE

## 2024-07-23 PROCEDURE — 3008F BODY MASS INDEX DOCD: CPT | Performed by: INTERNAL MEDICINE

## 2024-07-23 PROCEDURE — 80053 COMPREHEN METABOLIC PANEL: CPT

## 2024-07-23 PROCEDURE — 3079F DIAST BP 80-89 MM HG: CPT | Performed by: INTERNAL MEDICINE

## 2024-07-23 PROCEDURE — 85027 COMPLETE CBC AUTOMATED: CPT

## 2024-07-23 PROCEDURE — 83036 HEMOGLOBIN GLYCOSYLATED A1C: CPT

## 2024-07-23 PROCEDURE — 36415 COLL VENOUS BLD VENIPUNCTURE: CPT

## 2024-07-23 PROCEDURE — 80061 LIPID PANEL: CPT

## 2024-07-23 PROCEDURE — 99203 OFFICE O/P NEW LOW 30 MIN: CPT | Performed by: INTERNAL MEDICINE

## 2024-07-23 PROCEDURE — 84443 ASSAY THYROID STIM HORMONE: CPT

## 2024-07-23 PROCEDURE — 4004F PT TOBACCO SCREEN RCVD TLK: CPT | Performed by: INTERNAL MEDICINE

## 2024-07-23 RX ORDER — BACITRACIN ZINC 500 UNIT/G
OINTMENT (GRAM) TOPICAL 2 TIMES DAILY
Qty: 14 G | Refills: 0 | Status: SHIPPED | OUTPATIENT
Start: 2024-07-23

## 2024-07-23 ASSESSMENT — ENCOUNTER SYMPTOMS
DIZZINESS: 0
FLANK PAIN: 0
SORE THROAT: 0
APPETITE CHANGE: 0
SHORTNESS OF BREATH: 0
WEAKNESS: 0
ACTIVITY CHANGE: 0
LIGHT-HEADEDNESS: 0
NECK PAIN: 0
ABDOMINAL PAIN: 0
FREQUENCY: 0
NERVOUS/ANXIOUS: 0
DIFFICULTY URINATING: 0
CHILLS: 0
BLOOD IN STOOL: 0
CHEST TIGHTNESS: 0
BACK PAIN: 0
DYSURIA: 0
HEADACHES: 1
UNEXPECTED WEIGHT CHANGE: 0
ARTHRALGIAS: 0
COUGH: 0
SLEEP DISTURBANCE: 0
DECREASED CONCENTRATION: 0
WHEEZING: 0
PALPITATIONS: 0

## 2024-07-23 NOTE — PROGRESS NOTES
"Subjective   Patient ID: Jesusita Her is a 33 y.o. female who presents for New Patient Visit (Pt present today to [facility]. ).    HPI Ms. Phillips is seen today for establishing care. Her medical history is significant for gestational diabetes, hypertension, migraine headaches, obesity. She is experiencing weight issues and has appointment with bariatric team. She is trying to eat better and has cut back on sugary drinks. She smokes daily but is willing to quit.  She lives with her children. Ages 13 and 14 month old    Review of Systems   Constitutional:  Negative for activity change, appetite change, chills and unexpected weight change.   HENT:  Negative for congestion, postnasal drip and sore throat.    Eyes:  Negative for visual disturbance.   Respiratory:  Negative for cough, chest tightness, shortness of breath and wheezing.    Cardiovascular:  Negative for chest pain, palpitations and leg swelling.   Gastrointestinal:  Negative for abdominal pain and blood in stool.   Endocrine: Negative for cold intolerance and heat intolerance.   Genitourinary:  Positive for vaginal discharge. Negative for difficulty urinating, dysuria, flank pain and frequency.   Musculoskeletal:  Negative for arthralgias, back pain, gait problem and neck pain.   Skin:  Negative for rash.   Allergic/Immunologic: Negative for food allergies.   Neurological:  Positive for headaches. Negative for dizziness, weakness and light-headedness.   Psychiatric/Behavioral:  Negative for decreased concentration and sleep disturbance. The patient is not nervous/anxious.        Objective   /83   Pulse 73   Temp 36.6 °C (97.8 °F)   Ht 1.651 m (5' 5\")   Wt 114 kg (251 lb)   BMI 41.77 kg/m²     Physical Exam  Vitals reviewed.   Constitutional:       General: She is not in acute distress.     Appearance: Normal appearance.   HENT:      Head: Normocephalic and atraumatic.      Mouth/Throat:      Mouth: Mucous membranes are moist.   Cardiovascular: "      Rate and Rhythm: Normal rate and regular rhythm.      Pulses: Normal pulses.   Pulmonary:      Effort: Pulmonary effort is normal. No respiratory distress.      Breath sounds: Normal breath sounds.   Abdominal:      General: Bowel sounds are normal. There is no distension.      Tenderness: There is no abdominal tenderness.   Musculoskeletal:         General: No swelling or tenderness. Normal range of motion.      Cervical back: Normal range of motion.   Skin:     General: Skin is warm.      Comments: Pustule in right axilla   Neurological:      General: No focal deficit present.      Mental Status: She is alert.      Coordination: Coordination normal.      Gait: Gait normal.   Psychiatric:         Mood and Affect: Mood normal.         Behavior: Behavior normal.         Assessment/Plan   Diagnoses and all orders for this visit:  Encounter to establish care  Comments:  Seen for establishing care  bloodwork ordered  encouraged her to quit smoking and eat healthy  Orders:  -     Referral to Primary Care  BMI 40.0-44.9, adult (Multi)  Comments:  Follow-up with bariatric or other weight management program  Orders:  -     Hemoglobin A1C; Future  Primary hypertension  Comments:  Well controlled without medications  has not started metoprolol yet  Orders:  -     CBC; Future  -     Comprehensive Metabolic Panel; Future  -     Lipid Panel; Future  -     TSH with reflex to Free T4 if abnormal; Future  Migraine without aura and with status migrainosus, not intractable  Comments:  Patient to take sumatriptan as prescribed for migraine headache  Hidradenitis suppurativa of right axilla  Comments:  Chronic hide rhinitis  appointment with dermatology  apply bacitracin topical  Orders:  -     bacitracin 500 unit/gram ointment; Apply topically 2 times a day.       Follow-up in six months or sooner if needed

## 2024-07-23 NOTE — PATIENT INSTRUCTIONS
Include plenty of fruits and vegetables as well as lean protein  Cut back on processed foods and added sugars  Maintain adequate water intake  Incorporate daily exercise or any form of physical activity for at least 30 minutes     Follow up in 6mths or sooner if needed

## 2024-07-25 ENCOUNTER — APPOINTMENT (OUTPATIENT)
Dept: PRIMARY CARE | Facility: CLINIC | Age: 34
End: 2024-07-25
Payer: COMMERCIAL

## 2024-08-06 NOTE — PROGRESS NOTES
"Subjective     Date: 8/6/2024 Time: 1:00 PM  Name: Jesusita Her  MRN: [id number]    This is a 33 y.o. female with morbid obesity (There is no height or weight on file to calculate BMI.) who presents to clinic for consideration of bariatric surgery. she has attempted and failed multiple diet and exercise regimens for weight loss. Initial Onset of obesity was {Initial Obesity Onset:18427:::0}.  Their goal for surgery is to  {Weight Loss Interest:60640}. The patient has tried multiple diets to lose weight including {Previous Diet Trials:62477}. The patient was most successful with the {Most Successful Diet:47133}. The most pounds lost on this diet were *** lbs. The patient considers their dietary weakness to be {Dietary Weakness:14715} The patient reports a  {Weight Pattern:26497::\"highest weight ever of *** pounds\",\"lowest weight ever of *** pounds\"} {Distribution of Obesity:56624}. Current diet: {Diet:95495}. Compliance: {Compliance:97119} Diet Problems: {Diet Problems:51145} } Dietary Details Include:{Dietary Details:18222} The patient {Exercise Frequency:50235} {Excercise Duration (Optional):58515} {Types of Exercise (Optional):69222}    {Comorbidities:00286}  Patient Active Problem List   Diagnosis    Chronic tension-type headache, intractable       {Procedure Preferred:61158}    {GERDQOL:82966}    PMH:   Past Medical History:   Diagnosis Date    Elevated blood-pressure reading, without diagnosis of hypertension 11/24/2021    Elevated blood pressure reading    Encounter for screening for infections with a predominantly sexual mode of transmission 08/07/2020    Routine screening for STI (sexually transmitted infection)    Encounter for screening for malignant neoplasm of cervix     Screening for cervical cancer    Furuncle, unspecified 03/11/2022    Boil    Hypertrophy of tonsils with hypertrophy of adenoids 04/17/2018    Tonsillar and adenoid hypertrophy    Irregular menstruation, unspecified 06/03/2020    Missed " menses    Migraine, unspecified, not intractable, without status migrainosus 2020    Migraines    Nicotine dependence, cigarettes, uncomplicated 2021    Cigarette nicotine dependence without complication    Personal history of other diseases of the circulatory system     History of hypertension    Personal history of other diseases of the female genital tract 2020    History of amenorrhea    Personal history of other diseases of the respiratory system 2019    History of tonsillitis    Personal history of other infectious and parasitic diseases 2018    History of trichomoniasis    Personal history of other infectious and parasitic diseases     History of chlamydia infection    Personal history of other medical treatment     History of positive purified protein derivative test    Personal history of other specified conditions     History of snoring    Personal history of other specified conditions 2020    History of dysuria    Procedure and treatment not carried out for other reasons 2020    Attempted IUD removal, unsuccessful    Unspecified complication of genitourinary prosthetic device, implant and graft, initial encounter (CMS-[facility]) 2020    IUD complication        PSH:   Past Surgical History:   Procedure Laterality Date    OTHER SURGICAL HISTORY  2018    Surgical Treatment For         STOPBANG *** (+ ***).   *** personal/family hx of VTE.    FAMILY HISTORY:  Family History   Problem Relation Name Age of Onset    Lung cancer Mother      Diabetes Father          SOCIAL HISTORY:  Social History     Tobacco Use    Smoking status: Every Day     Types: Cigarettes     Passive exposure: Never    Smokeless tobacco: Never   Substance Use Topics    Alcohol use: Yes     Alcohol/week: 1.0 standard drink of alcohol     Types: 1 Standard drinks or equivalent per week    Drug use: Never       MEDICATIONS:  Prior to Admission Medications:  Medication Documentation  Review Audit       Reviewed by Neo Dos Santos MD (Physician) on 07/23/24 at 0932      Medication Order Taking? Sig Documenting Provider Last Dose Status   ibuprofen (Motrin) capsule [id number]  Take 1 capsule (200 mg) by mouth every 6 hours if needed (menstrual cramps.). BRI Barnett  Active   metoprolol tartrate (Lopressor) 25 mg tablet [id number]  Take 1 tablet (25 mg) by mouth once daily. ALANA Finch  Active   SUMAtriptan (Imitrex) 100 mg tablet [id number]  Take 1 tablet (100 mg) by mouth 1 time if needed for migraine. ALANA Finch  Active                     ALLERGIES:  Allergies   Allergen Reactions    Penicillin Hives       REVIEW OF SYSTEMS:  GENERAL: Negative for malaise, significant weight loss and fever  HEAD: Negative for headache, swelling.  NECK: Negative for lumps, goiter, pain and significant neck swelling  RESPIRATORY: Negative for cough, wheezing or shortness of breath.  CARDIOVASCULAR: Negative for chest pain, leg swelling or palpitations.  GI: Negative for abdominal discomfort, blood in stools or black stools or change in bowel habits  : No history of dysuria, frequency or incontinence  MUSCULOSKELETAL: Negative for joint pain or swelling, back pain or muscle pain.  SKIN: Negative for lesions, rash, and itching.  PSYCH: Negative for sleep disturbance, mood disorder and recent psychosocial stressors.  ENDOCRINE: Negative for cold or heat intolerance, polyuria, polydipsia and goiter.    Objective   PHYSICAL EXAM:  Visit Vitals  OB Status Implant   Smoking Status Every Day     General appearance: obese, NAD  Neuro: AOx3  Head: EOMI; no swelling or lesions of scalp or face  ENT:  no lumps or lymphadenopathy, thyroid normal to palpation; oropharynx clear, no swelling or erythema  Skin: warm, no erythema or rashes  Lungs: clear to percussion and auscultation  Heart: regular rhythm and S1, S2 normal  Abdomen: soft, non-tender, no masses, no organomegaly  Extremities:  Normal exam of the extremities. No swelling or pain.  Psych: no hurried speech, no flight of ideas, normal affect    Assessment/Plan   IMPRESSION:  Jesusita Her is a 33 y.o. female with a BMI of There is no height or weight on file to calculate BMI. with the following diagnoses and co-morbidities:     Past Medical History:   Diagnosis Date    Elevated blood-pressure reading, without diagnosis of hypertension 11/24/2021    Elevated blood pressure reading    Encounter for screening for infections with a predominantly sexual mode of transmission 08/07/2020    Routine screening for STI (sexually transmitted infection)    Encounter for screening for malignant neoplasm of cervix     Screening for cervical cancer    Furuncle, unspecified 03/11/2022    Boil    Hypertrophy of tonsils with hypertrophy of adenoids 04/17/2018    Tonsillar and adenoid hypertrophy    Irregular menstruation, unspecified 06/03/2020    Missed menses    Migraine, unspecified, not intractable, without status migrainosus 06/24/2020    Migraines    Nicotine dependence, cigarettes, uncomplicated 11/24/2021    Cigarette nicotine dependence without complication    Personal history of other diseases of the circulatory system     History of hypertension    Personal history of other diseases of the female genital tract 08/07/2020    History of amenorrhea    Personal history of other diseases of the respiratory system 07/24/2019    History of tonsillitis    Personal history of other infectious and parasitic diseases 04/23/2018    History of trichomoniasis    Personal history of other infectious and parasitic diseases     History of chlamydia infection    Personal history of other medical treatment     History of positive purified protein derivative test    Personal history of other specified conditions     History of snoring    Personal history of other specified conditions 08/07/2020    History of dysuria    Procedure and treatment not carried out for  other reasons 02/17/2020    Attempted IUD removal, unsuccessful    Unspecified complication of genitourinary prosthetic device, implant and graft, initial encounter (CMS-[facility]) 02/09/2020    IUD complication       This patient does meet the criteria for a surgical weight loss procedure according to NIH guidelines.  The risks of sleeve gastrectomy, Terrell-en-Y gastric bypass, and duodenal switch surgery including but not limited to bleeding, leak along staple lines, infection, dehydration, ulcers, internal hernia, DVT/PE, pneumonia, myocardial infarction, prolonged nausea/vomiting, incomplete resolution of associated medical conditions, reflux, weight regain, vitamin/mineral deficiencies, and death have been explained to the patient and Jesusita Her has expressed understanding and acceptance of them.     We discussed the lifestyle changes necessary to be successful following surgery.    The increased risk of substance and alcohol abuse following bariatric surgery was discussed with the patient, along with the negative consequences of substance/alcohol use after surgery including addiction, worsening of mental health disorders, and injury to the stomach. The risk of smoking and vaping (tobacco or any other substance) after bariatric surgery was explained to the patient. This includes risk of anastamotic ulcers, gastritis, bleeding, perforation, stricture, and PO intolerance.  The patient expressed understanding and acceptance of these risks.    ***The patient was advised not to become pregnant within 12-18 months following bariatric surgery. She was educated on the increased risks to mother and fetus associated with pregnancy within 2 years of bariatric surgery.    The benefits of the above surgeries including weight loss, improvement/resolution of associated medical and mental health conditions, improved mobility, and decreased mortality have been explained the the patient and Jesusita Her has expressed  understanding and acceptance of them.      PLAN:  The plan of treatment for Jesusita Her is to continue with the consultations and tests ordered today in hopes of qualifying for pre-operative clearance for bariatric surgery. This includes:    Consult Nutrition for education   Consult Psychology  Consult Cardiology  Labs ordered  EGD  PCP for medical optimization  Consult sleep medicine - concern for JOSE  Recommend at least *** lbs of weight loss prior to surgery.  ***        *** minutes were spent with patient including history, physical exam, and education.

## 2024-08-13 NOTE — PROGRESS NOTES
"Subjective     Date: 8/13/2024 Time: 1:02 PM  Name: Jesusita Her  MRN: [id number]    This is a 33 y.o. female with morbid obesity (There is no height or weight on file to calculate BMI.) who presents to clinic for consideration of bariatric surgery. she has attempted and failed multiple diet and exercise regimens for weight loss. Initial Onset of obesity was {Initial Obesity Onset:30254:::0}.  Their goal for surgery is to  {Weight Loss Interest:88368}. The patient has tried multiple diets to lose weight including {Previous Diet Trials:13348}. The patient was most successful with the {Most Successful Diet:48806}. The most pounds lost on this diet were *** lbs. The patient considers their dietary weakness to be {Dietary Weakness:17498} The patient reports a  {Weight Pattern:02896::\"highest weight ever of *** pounds\",\"lowest weight ever of *** pounds\"} {Distribution of Obesity:86859}. Current diet: {Diet:30614}. Compliance: {Compliance:44784} Diet Problems: {Diet Problems:47331} } Dietary Details Include:{Dietary Details:18621} The patient {Exercise Frequency:93436} {Excercise Duration (Optional):69137} {Types of Exercise (Optional):26067}    {Comorbidities:30465}  Patient Active Problem List   Diagnosis    Chronic tension-type headache, intractable       {Procedure Preferred:67377}    {GERDQOL:30300}    PMH:   Past Medical History:   Diagnosis Date    Elevated blood-pressure reading, without diagnosis of hypertension 11/24/2021    Elevated blood pressure reading    Encounter for screening for infections with a predominantly sexual mode of transmission 08/07/2020    Routine screening for STI (sexually transmitted infection)    Encounter for screening for malignant neoplasm of cervix     Screening for cervical cancer    Furuncle, unspecified 03/11/2022    Boil    Hypertrophy of tonsils with hypertrophy of adenoids 04/17/2018    Tonsillar and adenoid hypertrophy    Irregular menstruation, unspecified 06/03/2020    " Missed menses    Migraine, unspecified, not intractable, without status migrainosus 2020    Migraines    Nicotine dependence, cigarettes, uncomplicated 2021    Cigarette nicotine dependence without complication    Personal history of other diseases of the circulatory system     History of hypertension    Personal history of other diseases of the female genital tract 2020    History of amenorrhea    Personal history of other diseases of the respiratory system 2019    History of tonsillitis    Personal history of other infectious and parasitic diseases 2018    History of trichomoniasis    Personal history of other infectious and parasitic diseases     History of chlamydia infection    Personal history of other medical treatment     History of positive purified protein derivative test    Personal history of other specified conditions     History of snoring    Personal history of other specified conditions 2020    History of dysuria    Procedure and treatment not carried out for other reasons 2020    Attempted IUD removal, unsuccessful    Unspecified complication of genitourinary prosthetic device, implant and graft, initial encounter (CMS-HCC) 2020    IUD complication        PSH:   Past Surgical History:   Procedure Laterality Date    OTHER SURGICAL HISTORY  2018    Surgical Treatment For         STOPBANG *** (+ ***).   *** personal/family hx of VTE.    FAMILY HISTORY:  Family History   Problem Relation Name Age of Onset    Lung cancer Mother      Diabetes Father          SOCIAL HISTORY:  Social History     Tobacco Use    Smoking status: Every Day     Types: Cigarettes     Passive exposure: Never    Smokeless tobacco: Never   Substance Use Topics    Alcohol use: Yes     Alcohol/week: 1.0 standard drink of alcohol     Types: 1 Standard drinks or equivalent per week    Drug use: Never       MEDICATIONS:  Prior to Admission Medications:  Medication  Documentation Review Audit       Reviewed by Neo Dos Santos MD (Physician) on 07/23/24 at 0932      Medication Order Taking? Sig Documenting Provider Last Dose Status   ibuprofen (Motrin) capsule [id number]  Take 1 capsule (200 mg) by mouth every 6 hours if needed (menstrual cramps.). CODEY Barnett-CARO  Active   metoprolol tartrate (Lopressor) 25 mg tablet [id number]  Take 1 tablet (25 mg) by mouth once daily. CODEY Finch-CNP  Active   SUMAtriptan (Imitrex) 100 mg tablet [id number]  Take 1 tablet (100 mg) by mouth 1 time if needed for migraine. CODEY Finch-ALMA ROSA  Active                     ALLERGIES:  Allergies   Allergen Reactions    Penicillin Hives       REVIEW OF SYSTEMS:  GENERAL: Negative for malaise, significant weight loss and fever  HEAD: Negative for headache, swelling.  NECK: Negative for lumps, goiter, pain and significant neck swelling  RESPIRATORY: Negative for cough, wheezing or shortness of breath.  CARDIOVASCULAR: Negative for chest pain, leg swelling or palpitations.  GI: Negative for abdominal discomfort, blood in stools or black stools or change in bowel habits  : No history of dysuria, frequency or incontinence  MUSCULOSKELETAL: Negative for joint pain or swelling, back pain or muscle pain.  SKIN: Negative for lesions, rash, and itching.  PSYCH: Negative for sleep disturbance, mood disorder and recent psychosocial stressors.  ENDOCRINE: Negative for cold or heat intolerance, polyuria, polydipsia and goiter.    Objective   PHYSICAL EXAM:  Visit Vitals  OB Status Implant   Smoking Status Every Day     General appearance: obese, NAD  Neuro: AOx3  Head: EOMI; no swelling or lesions of scalp or face  ENT:  no lumps or lymphadenopathy, thyroid normal to palpation; oropharynx clear, no swelling or erythema  Skin: warm, no erythema or rashes  Lungs: clear to percussion and auscultation  Heart: regular rhythm and S1, S2 normal  Abdomen: soft, non-tender, no masses, no  organomegaly  Extremities: Normal exam of the extremities. No swelling or pain.  Psych: no hurried speech, no flight of ideas, normal affect    Assessment/Plan   IMPRESSION:  Jesusita Her is a 33 y.o. female with a BMI of There is no height or weight on file to calculate BMI. with the following diagnoses and co-morbidities:     Past Medical History:   Diagnosis Date    Elevated blood-pressure reading, without diagnosis of hypertension 11/24/2021    Elevated blood pressure reading    Encounter for screening for infections with a predominantly sexual mode of transmission 08/07/2020    Routine screening for STI (sexually transmitted infection)    Encounter for screening for malignant neoplasm of cervix     Screening for cervical cancer    Furuncle, unspecified 03/11/2022    Boil    Hypertrophy of tonsils with hypertrophy of adenoids 04/17/2018    Tonsillar and adenoid hypertrophy    Irregular menstruation, unspecified 06/03/2020    Missed menses    Migraine, unspecified, not intractable, without status migrainosus 06/24/2020    Migraines    Nicotine dependence, cigarettes, uncomplicated 11/24/2021    Cigarette nicotine dependence without complication    Personal history of other diseases of the circulatory system     History of hypertension    Personal history of other diseases of the female genital tract 08/07/2020    History of amenorrhea    Personal history of other diseases of the respiratory system 07/24/2019    History of tonsillitis    Personal history of other infectious and parasitic diseases 04/23/2018    History of trichomoniasis    Personal history of other infectious and parasitic diseases     History of chlamydia infection    Personal history of other medical treatment     History of positive purified protein derivative test    Personal history of other specified conditions     History of snoring    Personal history of other specified conditions 08/07/2020    History of dysuria    Procedure and  treatment not carried out for other reasons 02/17/2020    Attempted IUD removal, unsuccessful    Unspecified complication of genitourinary prosthetic device, implant and graft, initial encounter (CMS-[facility]) 02/09/2020    IUD complication       This patient does meet the criteria for a surgical weight loss procedure according to NIH guidelines.  The risks of sleeve gastrectomy, Terrell-en-Y gastric bypass, and duodenal switch surgery including but not limited to bleeding, leak along staple lines, infection, dehydration, ulcers, internal hernia, DVT/PE, pneumonia, myocardial infarction, prolonged nausea/vomiting, incomplete resolution of associated medical conditions, reflux, weight regain, vitamin/mineral deficiencies, and death have been explained to the patient and Jesusita Her has expressed understanding and acceptance of them.     We discussed the lifestyle changes necessary to be successful following surgery.    The increased risk of substance and alcohol abuse following bariatric surgery was discussed with the patient, along with the negative consequences of substance/alcohol use after surgery including addiction, worsening of mental health disorders, and injury to the stomach. The risk of smoking and vaping (tobacco or any other substance) after bariatric surgery was explained to the patient. This includes risk of anastamotic ulcers, gastritis, bleeding, perforation, stricture, and PO intolerance.  The patient expressed understanding and acceptance of these risks.    ***The patient was advised not to become pregnant within 12-18 months following bariatric surgery. She was educated on the increased risks to mother and fetus associated with pregnancy within 2 years of bariatric surgery.    The benefits of the above surgeries including weight loss, improvement/resolution of associated medical and mental health conditions, improved mobility, and decreased mortality have been explained the the patient and Jesusita  Arden has expressed understanding and acceptance of them.      PLAN:  The plan of treatment for Jesusita Her is to continue with the consultations and tests ordered today in hopes of qualifying for pre-operative clearance for bariatric surgery. This includes:    Consult Nutrition for education  Consult Psychology  Consult Cardiology  Labs ordered  EGD  PCP for medical optimization  Consult sleep medicine - concern for JOSE  Recommend at least *** lbs of weight loss prior to surgery.  ***        *** minutes were spent with patient including history, physical exam, and education.

## 2024-08-14 ENCOUNTER — HOSPITAL ENCOUNTER (EMERGENCY)
Facility: HOSPITAL | Age: 34
Discharge: HOME | End: 2024-08-14
Payer: COMMERCIAL

## 2024-08-14 VITALS
DIASTOLIC BLOOD PRESSURE: 89 MMHG | SYSTOLIC BLOOD PRESSURE: 130 MMHG | HEIGHT: 66 IN | WEIGHT: 253 LBS | RESPIRATION RATE: 16 BRPM | BODY MASS INDEX: 40.66 KG/M2 | TEMPERATURE: 96.5 F | HEART RATE: 78 BPM | OXYGEN SATURATION: 97 %

## 2024-08-14 DIAGNOSIS — G43.909 MIGRAINE WITHOUT STATUS MIGRAINOSUS, NOT INTRACTABLE, UNSPECIFIED MIGRAINE TYPE: Primary | ICD-10-CM

## 2024-08-14 LAB — PREGNANCY TEST URINE, POC: NEGATIVE

## 2024-08-14 PROCEDURE — 99284 EMERGENCY DEPT VISIT MOD MDM: CPT

## 2024-08-14 PROCEDURE — 2500000004 HC RX 250 GENERAL PHARMACY W/ HCPCS (ALT 636 FOR OP/ED): Mod: SE

## 2024-08-14 PROCEDURE — 96375 TX/PRO/DX INJ NEW DRUG ADDON: CPT

## 2024-08-14 PROCEDURE — 96374 THER/PROPH/DIAG INJ IV PUSH: CPT | Mod: 59

## 2024-08-14 PROCEDURE — 96361 HYDRATE IV INFUSION ADD-ON: CPT

## 2024-08-14 PROCEDURE — 81025 URINE PREGNANCY TEST: CPT

## 2024-08-14 RX ORDER — KETOROLAC TROMETHAMINE 30 MG/ML
30 INJECTION, SOLUTION INTRAMUSCULAR; INTRAVENOUS ONCE
Status: COMPLETED | OUTPATIENT
Start: 2024-08-14 | End: 2024-08-14

## 2024-08-14 RX ORDER — METOCLOPRAMIDE HYDROCHLORIDE 5 MG/ML
10 INJECTION INTRAMUSCULAR; INTRAVENOUS ONCE
Status: COMPLETED | OUTPATIENT
Start: 2024-08-14 | End: 2024-08-14

## 2024-08-14 RX ORDER — DIPHENHYDRAMINE HYDROCHLORIDE 50 MG/ML
25 INJECTION INTRAMUSCULAR; INTRAVENOUS ONCE
Status: COMPLETED | OUTPATIENT
Start: 2024-08-14 | End: 2024-08-14

## 2024-08-14 ASSESSMENT — COLUMBIA-SUICIDE SEVERITY RATING SCALE - C-SSRS
1. IN THE PAST MONTH, HAVE YOU WISHED YOU WERE DEAD OR WISHED YOU COULD GO TO SLEEP AND NOT WAKE UP?: NO
6. HAVE YOU EVER DONE ANYTHING, STARTED TO DO ANYTHING, OR PREPARED TO DO ANYTHING TO END YOUR LIFE?: NO
2. HAVE YOU ACTUALLY HAD ANY THOUGHTS OF KILLING YOURSELF?: NO

## 2024-08-14 ASSESSMENT — PAIN SCALES - GENERAL: PAINLEVEL_OUTOF10: 9

## 2024-08-14 ASSESSMENT — PAIN - FUNCTIONAL ASSESSMENT: PAIN_FUNCTIONAL_ASSESSMENT: 0-10

## 2024-08-14 NOTE — ED TRIAGE NOTES
Migraine x 4 days. Has taken tylenol and excedrin with no relief. Has hx of migraines and states doesn't normally last this long. Has pressure behind eyes and tension in back of head and neck

## 2024-08-14 NOTE — ED PROVIDER NOTES
HPI   Chief Complaint   Patient presents with   • Headache   This is a 33-year-old female with a past medical history significant for hypertension, PCOS and migraines who presents to the ED with a headache.  Patient states that for the past 4 days she has had pressure-like pain in her forehead and behind her eyes, rated 8/10.  Denies any photophobia or phonophobia.  Denies any falls or traumas.  She states that her symptoms feel like her typical migraine pain, however the pain has never persisted this long.  She has taken Excedrin, Tylenol and Aleve at home, which she states has stopped working.  She denies any sudden onset or this being the worst headache of her life.     Denies any fevers, chills, dizziness, confusion, neck pain or rigidity, chest pain, shortness of breath     Limitations to history: None  Independent Historians: Patient  External Records Reviewed: Prior ED note    Patient History   Past Medical History:   Diagnosis Date   • Elevated blood-pressure reading, without diagnosis of hypertension 11/24/2021    Elevated blood pressure reading   • Encounter for screening for infections with a predominantly sexual mode of transmission 08/07/2020    Routine screening for STI (sexually transmitted infection)   • Encounter for screening for malignant neoplasm of cervix     Screening for cervical cancer   • Furuncle, unspecified 03/11/2022    Boil   • Hypertrophy of tonsils with hypertrophy of adenoids 04/17/2018    Tonsillar and adenoid hypertrophy   • Irregular menstruation, unspecified 06/03/2020    Missed menses   • Migraine, unspecified, not intractable, without status migrainosus 06/24/2020    Migraines   • Nicotine dependence, cigarettes, uncomplicated 11/24/2021    Cigarette nicotine dependence without complication   • Personal history of other diseases of the circulatory system     History of hypertension   • Personal history of other diseases of the female genital tract 08/07/2020    History of  amenorrhea   • Personal history of other diseases of the respiratory system 2019    History of tonsillitis   • Personal history of other infectious and parasitic diseases 2018    History of trichomoniasis   • Personal history of other infectious and parasitic diseases     History of chlamydia infection   • Personal history of other medical treatment     History of positive purified protein derivative test   • Personal history of other specified conditions     History of snoring   • Personal history of other specified conditions 2020    History of dysuria   • Procedure and treatment not carried out for other reasons 2020    Attempted IUD removal, unsuccessful   • Unspecified complication of genitourinary prosthetic device, implant and graft, initial encounter (CMS-HCC) 2020    IUD complication     Past Surgical History:   Procedure Laterality Date   • OTHER SURGICAL HISTORY  2018    Surgical Treatment For      Family History   Problem Relation Name Age of Onset   • Lung cancer Mother     • Diabetes Father       Social History     Tobacco Use   • Smoking status: Every Day     Types: Cigarettes     Passive exposure: Never   • Smokeless tobacco: Never   Substance Use Topics   • Alcohol use: Yes     Alcohol/week: 1.0 standard drink of alcohol     Types: 1 Standard drinks or equivalent per week   • Drug use: Never       Physical Exam   ED Triage Vitals [24 1214]   Temperature Pulse Respirations BP   35.8 °C (96.5 °F) -- 16 (!) 142/102      Pulse Ox Temp src Heart Rate Source Patient Position   100 % -- -- --      BP Location FiO2 (%)     -- --       Physical Exam  Constitutional:       General: She is not in acute distress.     Appearance: She is obese. She is not ill-appearing or diaphoretic.   HENT:      Head: Normocephalic and atraumatic.   Cardiovascular:      Rate and Rhythm: Normal rate and regular rhythm.      Heart sounds: Normal heart sounds.   Pulmonary:       Effort: Pulmonary effort is normal. No respiratory distress.      Breath sounds: Normal breath sounds.   Abdominal:      General: Bowel sounds are normal.      Palpations: Abdomen is soft.      Tenderness: There is no abdominal tenderness.   Musculoskeletal:      Cervical back: Normal range of motion and neck supple. No rigidity or tenderness.   Lymphadenopathy:      Cervical: No cervical adenopathy.   Neurological:      Mental Status: She is alert.         ED Course & MDM   ED Course as of 08/14/24 1326   Wed Aug 14, 2024   1259 POCT pregnancy, urine  Negative [LH]      ED Course User Index  [LH] Nita Villarreal PA-C                 Medical Decision Making  This is a 33-year-old female with a past medical history significant for hypertension, PCOS and migraines who presents to the ED with a headache.  Patient states that for the past 4 days she has had pressure-like pain in her forehead and behind her eyes, rated 8/10.  Denies any photophobia or phonophobia.     On physical exam, patient is overall well-appearing, toxic and in no acute distress.  Head is normocephalic and atraumatic.  Neck is supple with full, non tender ROM and no cervical lymphadenopathy.  Patient is A&Ox3 with no focal neurological deficits.  She was observed to ambulate in the ED, unassisted with a stable gait and normal coordination.     Patient is febrile, well-appearing with no confusion or meningeal signs, low suspicion for meningitis or encephalitis.  She denies any falls or injuries, denies any sudden onset or this being the worst headache of her life, low suspicion for acute intercranial hemorrhage.  Administered Toradol, Reglan, Benadryl and IV fluids as a headache cocktail.  On reassessment, patient states her pain has improved, she is feeling better and she would like to go home.  Discussed ED return criteria.  Counseled patient to follow-up with her PCP.  Patient verbalized understanding and was agreeable plan of care.  Discharged in  stable condition.

## 2024-08-14 NOTE — ED PROVIDER NOTES
HPI   Chief Complaint   Patient presents with    Headache   This is a 33-year-old female with a past medical history significant for hypertension who presents to the ED for a headache.  Patient states that for the past 4 days she has been experiencing pressure-like pain in her forehead and behind both of her eyes rated 8/10.  Endorses mild photophobia without any phonophobia. Denies any visual disturbances.  Denies any falls or traumas.  She has taken Excedrin, Tylenol and Aleve for her symptoms, she states that they have stopped working.  Reports that her pain feels similar to her prior migraines, however her symptoms do not normally last this long.  Denies any sudden onset or this being the worst headache of her life.     Denies any fevers, chills, dizziness, chest pain, shortness of breath, abdominal pain, N/V/D      Limitations to history: None  Independent Historians: Patient  External Records Reviewed: None    Patient History   Past Medical History:   Diagnosis Date    Elevated blood-pressure reading, without diagnosis of hypertension 11/24/2021    Elevated blood pressure reading    Encounter for screening for infections with a predominantly sexual mode of transmission 08/07/2020    Routine screening for STI (sexually transmitted infection)    Encounter for screening for malignant neoplasm of cervix     Screening for cervical cancer    Furuncle, unspecified 03/11/2022    Boil    Hypertrophy of tonsils with hypertrophy of adenoids 04/17/2018    Tonsillar and adenoid hypertrophy    Irregular menstruation, unspecified 06/03/2020    Missed menses    Migraine, unspecified, not intractable, without status migrainosus 06/24/2020    Migraines    Nicotine dependence, cigarettes, uncomplicated 11/24/2021    Cigarette nicotine dependence without complication    Personal history of other diseases of the circulatory system     History of hypertension    Personal history of other diseases of the female genital tract  2020    History of amenorrhea    Personal history of other diseases of the respiratory system 2019    History of tonsillitis    Personal history of other infectious and parasitic diseases 2018    History of trichomoniasis    Personal history of other infectious and parasitic diseases     History of chlamydia infection    Personal history of other medical treatment     History of positive purified protein derivative test    Personal history of other specified conditions     History of snoring    Personal history of other specified conditions 2020    History of dysuria    Procedure and treatment not carried out for other reasons 2020    Attempted IUD removal, unsuccessful    Unspecified complication of genitourinary prosthetic device, implant and graft, initial encounter (CMS-HCC) 2020    IUD complication     Past Surgical History:   Procedure Laterality Date    OTHER SURGICAL HISTORY  2018    Surgical Treatment For      Family History   Problem Relation Name Age of Onset    Lung cancer Mother      Diabetes Father       Social History     Tobacco Use    Smoking status: Every Day     Types: Cigarettes     Passive exposure: Never    Smokeless tobacco: Never   Substance Use Topics    Alcohol use: Yes     Alcohol/week: 1.0 standard drink of alcohol     Types: 1 Standard drinks or equivalent per week    Drug use: Never       Physical Exam   ED Triage Vitals   Temperature Heart Rate Respirations BP   24 1214 24 1303 24 1214 24 1214   35.8 °C (96.5 °F) 78 16 (!) 142/102      Pulse Ox Temp src Heart Rate Source Patient Position   24 1214 -- 24 1303 --   100 %  Monitor       BP Location FiO2 (%)     -- --             Physical Exam  Constitutional:       General: She is not in acute distress.     Appearance: She is not ill-appearing.   HENT:      Head: Normocephalic and atraumatic.   Eyes:      Extraocular Movements: Extraocular movements  intact.      Pupils: Pupils are equal, round, and reactive to light.   Cardiovascular:      Rate and Rhythm: Normal rate and regular rhythm.      Heart sounds: Normal heart sounds.   Pulmonary:      Effort: No respiratory distress.      Breath sounds: Normal breath sounds.   Abdominal:      General: Bowel sounds are normal.      Palpations: Abdomen is soft.      Tenderness: There is no abdominal tenderness.   Musculoskeletal:         General: Normal range of motion.      Cervical back: Normal range of motion and neck supple. No rigidity or tenderness. No pain with movement.   Lymphadenopathy:      Cervical: No cervical adenopathy.   Skin:     General: Skin is warm and dry.   Neurological:      General: No focal deficit present.      Mental Status: She is alert and oriented to person, place, and time.      Motor: No weakness.      Coordination: Coordination normal.      Gait: Gait normal.         ED Course & MDM   ED Course as of 08/14/24 1535   Wed Aug 14, 2024   1259 POCT pregnancy, urine  Negative [LH]      ED Course User Index  [LH] Nita Villarreal PA-C         Diagnoses as of 08/14/24 1535   Migraine without status migrainosus, not intractable, unspecified migraine type         Medical Decision Making  This is a 33-year-old female with a past medical history significant for hypertension who presents to the ED for a headache.  Patient states that for the past 4 days she has been experiencing pressure-like pain in her forehead and behind both of her eyes rated 8/10.  Endorses mild photophobia without any phonophobia.    On physical exam, patient is overall well-appearing and in no acute distress.  Head is normocephalic and atraumatic.  Neck is supple with full, non tender, ROM.  Patient is A&Ox3 with no focal neurological deficits.  She was observed to ambulate in the ED, unassisted with a stable gait.  No weakness or abnormal coordination.    Patient denies any falls or traumas, denies any sudden onset or this  being the worst headache of her life, low suspicion for intracranial hemorrhage. Patient is afebrile without any confusion or meningeal signs, low suspicion for meningitis or encephalitis.   Administered IV fluids, Toradol, Reglan and Benadryl as a headache cocktail.  On reassessment, patient states that her headache has completely resolved and she is ready to go home. Provided an outpatient referral to neurology for follow up about her migraines. Discussed ED return criteria.  Patient verbalized understanding and was agreeable to plan of care.  Discharged in stable condition.            Nita Villarreal PA-C  08/14/24 [id number]

## 2024-08-16 ENCOUNTER — APPOINTMENT (OUTPATIENT)
Dept: SURGERY | Facility: CLINIC | Age: 34
End: 2024-08-16
Payer: COMMERCIAL

## 2024-08-16 ENCOUNTER — HOSPITAL ENCOUNTER (EMERGENCY)
Facility: HOSPITAL | Age: 34
Discharge: HOME | End: 2024-08-16
Attending: EMERGENCY MEDICINE
Payer: COMMERCIAL

## 2024-08-16 ENCOUNTER — APPOINTMENT (OUTPATIENT)
Dept: RADIOLOGY | Facility: HOSPITAL | Age: 34
End: 2024-08-16
Payer: COMMERCIAL

## 2024-08-16 VITALS
HEART RATE: 79 BPM | SYSTOLIC BLOOD PRESSURE: 125 MMHG | WEIGHT: 253 LBS | BODY MASS INDEX: 40.84 KG/M2 | OXYGEN SATURATION: 99 % | RESPIRATION RATE: 16 BRPM | DIASTOLIC BLOOD PRESSURE: 87 MMHG | TEMPERATURE: 98 F

## 2024-08-16 DIAGNOSIS — G43.909 MIGRAINE WITHOUT STATUS MIGRAINOSUS, NOT INTRACTABLE, UNSPECIFIED MIGRAINE TYPE: Primary | ICD-10-CM

## 2024-08-16 DIAGNOSIS — E66.01 CLASS 3 SEVERE OBESITY WITH BODY MASS INDEX (BMI) OF 40.0 TO 44.9 IN ADULT, UNSPECIFIED OBESITY TYPE, UNSPECIFIED WHETHER SERIOUS COMORBIDITY PRESENT (MULTI): ICD-10-CM

## 2024-08-16 LAB — PREGNANCY TEST URINE, POC: NEGATIVE

## 2024-08-16 PROCEDURE — 2500000004 HC RX 250 GENERAL PHARMACY W/ HCPCS (ALT 636 FOR OP/ED): Mod: SE

## 2024-08-16 PROCEDURE — 96361 HYDRATE IV INFUSION ADD-ON: CPT | Performed by: EMERGENCY MEDICINE

## 2024-08-16 PROCEDURE — 81025 URINE PREGNANCY TEST: CPT

## 2024-08-16 PROCEDURE — 70450 CT HEAD/BRAIN W/O DYE: CPT

## 2024-08-16 PROCEDURE — 96374 THER/PROPH/DIAG INJ IV PUSH: CPT | Performed by: EMERGENCY MEDICINE

## 2024-08-16 PROCEDURE — 70450 CT HEAD/BRAIN W/O DYE: CPT | Performed by: RADIOLOGY

## 2024-08-16 PROCEDURE — 99284 EMERGENCY DEPT VISIT MOD MDM: CPT | Mod: 25 | Performed by: EMERGENCY MEDICINE

## 2024-08-16 PROCEDURE — 99284 EMERGENCY DEPT VISIT MOD MDM: CPT

## 2024-08-16 RX ORDER — METOCLOPRAMIDE HYDROCHLORIDE 5 MG/ML
10 INJECTION INTRAMUSCULAR; INTRAVENOUS ONCE
Status: COMPLETED | OUTPATIENT
Start: 2024-08-16 | End: 2024-08-16

## 2024-08-16 RX ORDER — KETOROLAC TROMETHAMINE 30 MG/ML
INJECTION, SOLUTION INTRAMUSCULAR; INTRAVENOUS
Status: COMPLETED
Start: 2024-08-16 | End: 2024-08-16

## 2024-08-16 RX ORDER — DIPHENHYDRAMINE HYDROCHLORIDE 50 MG/ML
25 INJECTION INTRAMUSCULAR; INTRAVENOUS ONCE
Status: COMPLETED | OUTPATIENT
Start: 2024-08-16 | End: 2024-08-16

## 2024-08-16 RX ORDER — KETOROLAC TROMETHAMINE 30 MG/ML
30 INJECTION, SOLUTION INTRAMUSCULAR; INTRAVENOUS ONCE
Status: COMPLETED | OUTPATIENT
Start: 2024-08-16 | End: 2024-08-16

## 2024-08-16 ASSESSMENT — COLUMBIA-SUICIDE SEVERITY RATING SCALE - C-SSRS
2. HAVE YOU ACTUALLY HAD ANY THOUGHTS OF KILLING YOURSELF?: NO
6. HAVE YOU EVER DONE ANYTHING, STARTED TO DO ANYTHING, OR PREPARED TO DO ANYTHING TO END YOUR LIFE?: NO
1. IN THE PAST MONTH, HAVE YOU WISHED YOU WERE DEAD OR WISHED YOU COULD GO TO SLEEP AND NOT WAKE UP?: NO

## 2024-08-16 NOTE — ED TRIAGE NOTES
Pt presents to the ED c/o a migranes in the back of her head, pressure behind her eyes, and tension in her neck. Pt states that she tried to take her migraine medication but found no relief. Pt denies blurred visoon but states that it is painful to move her eyes.

## 2024-08-16 NOTE — ED PROVIDER NOTES
HPI   Chief Complaint   Patient presents with   • Headache   This is a 37-year-old this is a 33-year-old female with a past medical history significant for hypertension and migraines who presents the ED for a headache.  Patient states she is experiencing pressure-like pain behind both of her eyes, and tension in her neck, rated 8/10, that began as she was driving to work this morning.  Denies any photophobia or phonophobia. Denies any visual disturbances.  Denies any falls or traumas.  She states that her symptoms feel like her typical migraine pain.  She has taken Excedrin, Tylenol, Aleve and sumatriptan for her symptoms without any relief.   She was recently seen this ED 2 days ago for a similar complaint. She states that her migraines have been increasing in frequency.  She reports that for the past week she has been having migraines daily that do not resolve with OTC medications, as they usually do.     Denies any fevers, chills, dizziness, chest pain, shortness of breath, abdominal pain, N/V/D     Limitations to history: None  Independent Historians: Patient  External Records Reviewed: Prior ED note      Patient History   Past Medical History:   Diagnosis Date   • Elevated blood-pressure reading, without diagnosis of hypertension 11/24/2021    Elevated blood pressure reading   • Encounter for screening for infections with a predominantly sexual mode of transmission 08/07/2020    Routine screening for STI (sexually transmitted infection)   • Encounter for screening for malignant neoplasm of cervix     Screening for cervical cancer   • Furuncle, unspecified 03/11/2022    Boil   • Hypertrophy of tonsils with hypertrophy of adenoids 04/17/2018    Tonsillar and adenoid hypertrophy   • Irregular menstruation, unspecified 06/03/2020    Missed menses   • Migraine, unspecified, not intractable, without status migrainosus 06/24/2020    Migraines   • Nicotine dependence, cigarettes, uncomplicated 11/24/2021    Cigarette  nicotine dependence without complication   • Personal history of other diseases of the circulatory system     History of hypertension   • Personal history of other diseases of the female genital tract 2020    History of amenorrhea   • Personal history of other diseases of the respiratory system 2019    History of tonsillitis   • Personal history of other infectious and parasitic diseases 2018    History of trichomoniasis   • Personal history of other infectious and parasitic diseases     History of chlamydia infection   • Personal history of other medical treatment     History of positive purified protein derivative test   • Personal history of other specified conditions     History of snoring   • Personal history of other specified conditions 2020    History of dysuria   • Procedure and treatment not carried out for other reasons 2020    Attempted IUD removal, unsuccessful   • Unspecified complication of genitourinary prosthetic device, implant and graft, initial encounter (CMS-HCC) 2020    IUD complication     Past Surgical History:   Procedure Laterality Date   • OTHER SURGICAL HISTORY  2018    Surgical Treatment For      Family History   Problem Relation Name Age of Onset   • Lung cancer Mother     • Diabetes Father       Social History     Tobacco Use   • Smoking status: Every Day     Types: Cigarettes     Passive exposure: Never   • Smokeless tobacco: Never   Substance Use Topics   • Alcohol use: Yes     Alcohol/week: 1.0 standard drink of alcohol     Types: 1 Standard drinks or equivalent per week   • Drug use: Never       Physical Exam   ED Triage Vitals [24 1006]   Temperature Heart Rate Respirations BP   36.7 °C (98 °F) 81 16 (!) 136/97      Pulse Ox Temp Source Heart Rate Source Patient Position   99 % Temporal -- --      BP Location FiO2 (%)     -- --       Physical Exam  Constitutional:       General: She is not in acute distress.     Appearance:  She is not ill-appearing.   HENT:      Head: Normocephalic and atraumatic.   Cardiovascular:      Rate and Rhythm: Normal rate and regular rhythm.      Heart sounds: Normal heart sounds.   Pulmonary:      Effort: Pulmonary effort is normal. No respiratory distress.      Breath sounds: Normal breath sounds.   Abdominal:      General: Bowel sounds are normal.      Palpations: Abdomen is soft.      Tenderness: There is no abdominal tenderness.   Musculoskeletal:         General: No deformity or signs of injury.      Cervical back: Normal range of motion and neck supple. No pain with movement, spinous process tenderness or muscular tenderness.   Skin:     General: Skin is warm and dry.   Neurological:      General: No focal deficit present.      Mental Status: She is alert and oriented to person, place, and time.      Motor: No weakness.      Coordination: Coordination normal.      Gait: Gait normal.       ED Course & MDM   ED Course as of 08/16/24 1612   Fri Aug 16, 2024   1455 POCT pregnancy, urine  Negative [LH]      ED Course User Index  [LH] Nita Villarreal PA-C         Diagnoses as of 08/16/24 1612   Migraine without status migrainosus, not intractable, unspecified migraine type         Medical Decision Making  This is a 37-year-old this is a 33-year-old female with a past medical history significant for hypertension and migraines who presents the ED for a headache.  Patient states she is experiencing pressure-like pain behind both of her eyes, and tension in her neck, rated 8/10, that began as she was driving to work this morning.  Denies any photophobia or phonophobia.     On physical exam, patient is overall well-appearing and in no acute distress.  Head is normocephalic and atraumatic.  Neck is supple with full, non tender, ROM.  Patient is A&Ox3 with no focal neurological deficits.  She was observed to ambulate in the ED, unassisted with a stable gait.  No weakness or abnormal coordination.     Patient  endorses worsening migraines, obtained CT head which showed no acute intracranial hemorrhage or mass effect.  Administered IV fluids, Toradol, Benadryl and Reglan as a migraine cocktail.  On reassessment, patient states her pain has improved and she would like to go home.  She has a referral to neurology from her last ED visit.  Counseled her to also follow-up with her PCP.  Discussed ED return criteria.  Patient verbalized understanding and was agreeable to plan of care. Discharged in stable condition.       CT head wo IV contrast   Final Result   No acute intracranial hemorrhage or mass effect.        MACRO:   None        Signed by: Nayely Hernandez 8/16/2024 12:43 PM   Dictation workstation:   [id number]                Nita Villarreal PA-C  08/16/24 [id number]

## 2024-08-28 ENCOUNTER — APPOINTMENT (OUTPATIENT)
Dept: BEHAVIORAL HEALTH | Facility: CLINIC | Age: 34
End: 2024-08-28
Payer: COMMERCIAL

## 2024-08-28 ENCOUNTER — TELEPHONE (OUTPATIENT)
Dept: BEHAVIORAL HEALTH | Facility: CLINIC | Age: 34
End: 2024-08-28

## 2024-08-28 NOTE — PROGRESS NOTES
Pre-Bariatric Surgery Psychological Evaluation    Televideo Informed Consent for psychological evaluation was reviewed with the patient.     Understanding and verbal agreement was attested to by the patient. Patient identity was confirmed using 3 sources, including telephone number, email address and address. Provision of services via telehealth was necessitated by the restrictions on face-to-face visits accompanying the COVID-19 pandemic.     Non-secure Note: The patient has consented to a nonrestricted note.     I had the pleasure of seeing Jesusita Her, a 33 year old female, at your request for behavioral evaluation for appropriateness for bariatric surgery.      Weight History:        History of attempts to lose weight:      Typical day of eating:    Exercise:    Binging:  Purging:  Emotional eating:  Night eating:    Substances:    Caffeine:  Smoking:  Alcohol:  Drug use:     Knowledge about surgery     Psychosocial History  Ms. Her states that she lives in [place] with   Work: Patient works at a hospital      Psychological Status: Ms. states that     She states that her history is negative for obsessive-compulsive disorder, eating disorders, hany, thought disorders, and alcohol and drug abuse.   SI:  HI:  Trauma history:    Mental Status Exam:  Orientation:  Alert. Oriented x3.  Memory: intact.  Attention/Concentration: Normal/ Good.  Appearance:  Well-groomed. Casually Dressed. Good hygiene.   Behavior/Attitude: Cooperative. Pleasant. Good eye contact.  Motor: Relaxed. Calm. Normal motor activity.   Speech: Regular rate and volume. Fluent. No pressure.   Mood: Euthymic  Affect: Congruent to stated mood.   Thought process: Goal-directed. Linear. Organized.  Thought content: No paranoia, delusion or ideas of reference. No hallucinations in auditory, visual or other sensory modalities.   Suicidal ideation: denied.  Homicidal ideation: denied.   Insight: Good  Judgment: Good  Fund of knowledge:  Average    Behavioral issues relevant for candidacy for bariatric surgery include:   1) Motivation: Ms. Her states that she is highly motivated for surgery for health reasons. She states   2) History of compliance:  Ms. _ reports no history of problems with compliance with medication regimens.    3) History of attempts to lose weight: Ms. Herhas tried and failed at more conservative approaches to weight loss.  She has tried several of the popular diets including  with some success, but she has always gained the weight back over time.     4) Coping resources and social support:  Ms. Her feels able to cope with her mood issues actively and reports significant support from both family and friends in her pursuit of bariatric surgery.  She states that.    5) Ability to maintain behavior post-surgery:  In my opinion, Ms. Her is well-prepared to handle the behavioral demands that are consequent to bariatric surgery.  She has already begun changing her eating habits and exercise routine to prepare for the expected behavior changes after surgery.     6) Psychological contraindications to surgery:  A comprehensive review of psychological symptoms reveals no contraindications to surgery.     Impressions    Jesusita Her is able to provide informed consent and is an appropriate candidate for bariatric surgery from the psychological perspective.       Behavioral confirmation of her candidacy will come in the form of her ability to adhere to her  current dietary plan. Should she fare poorly with this adherence trial, please consider recommending a follow-up visit with a provider from our office for support and therapy.       Additionally, we had an extended discussion about behavioral responses to surgery for which she should be vigilant.  We discussed the post-surgical risks of mood deterioration, substance misuse, the development of compulsive behaviors and the development of maladaptive coping responses.  She  expressed understanding of these risks and we discussed how she could contact our office with appropriate haste if any of these maladaptive responses were to develop after surgery.      Thank you for allowing me to collaborate in the evaluation of your patient. Please feel free to contact me if I can provide any additional information.  Mae Raza Psy.D.  Clinical Psychologist  [facility], [id number] [address] [facility]    O) [phone]

## 2024-08-28 NOTE — PROGRESS NOTES
Briefly spoke with patient who explained she wants to delay consideration of bariatric surgery over the next few months while she tries to lose weight on her own with lifestyle changes and quit smoking on her own. She feels like she has the resources in place she needs for this and would like to meet up in January to see if she would like to proceed with bariatric surgery clearances.

## 2024-10-07 ENCOUNTER — APPOINTMENT (OUTPATIENT)
Dept: NEUROLOGY | Facility: CLINIC | Age: 34
End: 2024-10-07
Payer: COMMERCIAL

## 2024-10-18 ENCOUNTER — APPOINTMENT (OUTPATIENT)
Dept: SURGERY | Facility: CLINIC | Age: 34
End: 2024-10-18
Payer: COMMERCIAL

## 2024-10-28 ENCOUNTER — APPOINTMENT (OUTPATIENT)
Dept: DERMATOLOGY | Facility: CLINIC | Age: 34
End: 2024-10-28
Payer: COMMERCIAL

## 2024-11-14 NOTE — PROGRESS NOTES
"Subjective     Date: 11/14/2024 Time: 8:20 AM  Name: Jesusita Her  MRN: [id number]    This is a 33 y.o. female with morbid obesity (There is no height or weight on file to calculate BMI.) who presents to clinic for consideration of bariatric surgery. she has attempted and failed multiple diet and exercise regimens for weight loss. Initial Onset of obesity was {Initial Obesity Onset:36511:::0}.  Their goal for surgery is to  {Weight Loss Interest:19626}. The patient has tried multiple diets to lose weight including {Previous Diet Trials:46424}. The patient was most successful with the {Most Successful Diet:05336}. The most pounds lost on this diet were *** lbs. The patient considers their dietary weakness to be {Dietary Weakness:19485} The patient reports a  {Weight Pattern:49132::\"highest weight ever of *** pounds\",\"lowest weight ever of *** pounds\"} {Distribution of Obesity:72744}. Current diet: {Diet:92440}. Compliance: {Compliance:86636} Diet Problems: {Diet Problems:22602} } Dietary Details Include:{Dietary Details:84570} The patient {Exercise Frequency:56490} {Excercise Duration (Optional):12014} {Types of Exercise (Optional):29187}    {Comorbidities:13999}  Patient Active Problem List   Diagnosis    Chronic tension-type headache, intractable       {Procedure Preferred:02398}    {GERDQOL:86594}    PMH:   Past Medical History:   Diagnosis Date    Elevated blood-pressure reading, without diagnosis of hypertension 11/24/2021    Elevated blood pressure reading    Encounter for screening for infections with a predominantly sexual mode of transmission 08/07/2020    Routine screening for STI (sexually transmitted infection)    Encounter for screening for malignant neoplasm of cervix     Screening for cervical cancer    Furuncle, unspecified 03/11/2022    Boil    Hypertrophy of tonsils with hypertrophy of adenoids 04/17/2018    Tonsillar and adenoid hypertrophy    Irregular menstruation, unspecified 06/03/2020    " Missed menses    Migraine, unspecified, not intractable, without status migrainosus 2020    Migraines    Nicotine dependence, cigarettes, uncomplicated 2021    Cigarette nicotine dependence without complication    Personal history of other diseases of the circulatory system     History of hypertension    Personal history of other diseases of the female genital tract 2020    History of amenorrhea    Personal history of other diseases of the respiratory system 2019    History of tonsillitis    Personal history of other infectious and parasitic diseases 2018    History of trichomoniasis    Personal history of other infectious and parasitic diseases     History of chlamydia infection    Personal history of other medical treatment     History of positive purified protein derivative test    Personal history of other specified conditions     History of snoring    Personal history of other specified conditions 2020    History of dysuria    Procedure and treatment not carried out for other reasons 2020    Attempted IUD removal, unsuccessful    Unspecified complication of genitourinary prosthetic device, implant and graft, initial encounter (CMS-HCC) 2020    IUD complication        PSH:   Past Surgical History:   Procedure Laterality Date    OTHER SURGICAL HISTORY  2018    Surgical Treatment For         STOPBANG *** (+ ***).   *** personal/family hx of VTE.    FAMILY HISTORY:  Family History   Problem Relation Name Age of Onset    Lung cancer Mother      Diabetes Father          SOCIAL HISTORY:  Social History     Tobacco Use    Smoking status: Every Day     Types: Cigarettes     Passive exposure: Never    Smokeless tobacco: Never   Substance Use Topics    Alcohol use: Yes     Alcohol/week: 1.0 standard drink of alcohol     Types: 1 Standard drinks or equivalent per week    Drug use: Never       MEDICATIONS:  Prior to Admission Medications:  Medication  Documentation Review Audit       Reviewed by Crissy Moreno RN (Registered Nurse) on 08/16/24 at 1009      Medication Order Taking? Sig Documenting Provider Last Dose Status   bacitracin 500 unit/gram ointment [id number]  Apply topically 2 times a day. Neo Dos Santos MD  Active   ibuprofen (Motrin) capsule [id number]  Take 1 capsule (200 mg) by mouth every 6 hours if needed (menstrual cramps.). Thelma Hebert APRN-CNM  Active   metoprolol tartrate (Lopressor) 25 mg tablet [id number]  Take 1 tablet (25 mg) by mouth once daily. Lilliam Hebert APRN-CNP  Active   SUMAtriptan (Imitrex) 100 mg tablet [id number]  Take 1 tablet (100 mg) by mouth 1 time if needed for migraine. CODEY Finch-CNP  Active                     ALLERGIES:  Allergies   Allergen Reactions    Penicillin Hives       REVIEW OF SYSTEMS:  GENERAL: Negative for malaise, significant weight loss and fever  HEAD: Negative for headache, swelling.  NECK: Negative for lumps, goiter, pain and significant neck swelling  RESPIRATORY: Negative for cough, wheezing or shortness of breath.  CARDIOVASCULAR: Negative for chest pain, leg swelling or palpitations.  GI: Negative for abdominal discomfort, blood in stools or black stools or change in bowel habits  : No history of dysuria, frequency or incontinence  MUSCULOSKELETAL: Negative for joint pain or swelling, back pain or muscle pain.  SKIN: Negative for lesions, rash, and itching.  PSYCH: Negative for sleep disturbance, mood disorder and recent psychosocial stressors.  ENDOCRINE: Negative for cold or heat intolerance, polyuria, polydipsia and goiter.    Objective   PHYSICAL EXAM:  Visit Vitals  OB Status Implant   Smoking Status Every Day     General appearance: obese, NAD  Neuro: AOx3  Head: EOMI; no swelling or lesions of scalp or face  ENT:  no lumps or lymphadenopathy, thyroid normal to palpation; oropharynx clear, no swelling or erythema  Skin: warm, no erythema or rashes  Lungs: clear to  percussion and auscultation  Heart: regular rhythm and S1, S2 normal  Abdomen: soft, non-tender, no masses, no organomegaly  Extremities: Normal exam of the extremities. No swelling or pain.  Psych: no hurried speech, no flight of ideas, normal affect    Assessment/Plan   IMPRESSION:  Jesusita Her is a 33 y.o. female with a BMI of There is no height or weight on file to calculate BMI. with the following diagnoses and co-morbidities:     Past Medical History:   Diagnosis Date    Elevated blood-pressure reading, without diagnosis of hypertension 11/24/2021    Elevated blood pressure reading    Encounter for screening for infections with a predominantly sexual mode of transmission 08/07/2020    Routine screening for STI (sexually transmitted infection)    Encounter for screening for malignant neoplasm of cervix     Screening for cervical cancer    Furuncle, unspecified 03/11/2022    Boil    Hypertrophy of tonsils with hypertrophy of adenoids 04/17/2018    Tonsillar and adenoid hypertrophy    Irregular menstruation, unspecified 06/03/2020    Missed menses    Migraine, unspecified, not intractable, without status migrainosus 06/24/2020    Migraines    Nicotine dependence, cigarettes, uncomplicated 11/24/2021    Cigarette nicotine dependence without complication    Personal history of other diseases of the circulatory system     History of hypertension    Personal history of other diseases of the female genital tract 08/07/2020    History of amenorrhea    Personal history of other diseases of the respiratory system 07/24/2019    History of tonsillitis    Personal history of other infectious and parasitic diseases 04/23/2018    History of trichomoniasis    Personal history of other infectious and parasitic diseases     History of chlamydia infection    Personal history of other medical treatment     History of positive purified protein derivative test    Personal history of other specified conditions     History of  snoring    Personal history of other specified conditions 08/07/2020    History of dysuria    Procedure and treatment not carried out for other reasons 02/17/2020    Attempted IUD removal, unsuccessful    Unspecified complication of genitourinary prosthetic device, implant and graft, initial encounter (CMS-[facility]) 02/09/2020    IUD complication       This patient does meet the criteria for a surgical weight loss procedure according to NIH guidelines.  The risks of sleeve gastrectomy, Terrell-en-Y gastric bypass, and duodenal switch surgery including but not limited to bleeding, leak along staple lines, infection, dehydration, ulcers, internal hernia, DVT/PE, pneumonia, myocardial infarction, prolonged nausea/vomiting, incomplete resolution of associated medical conditions, reflux, weight regain, vitamin/mineral deficiencies, and death have been explained to the patient and Jesusita Arden has expressed understanding and acceptance of them.     We discussed the lifestyle changes necessary to be successful following surgery.    The increased risk of substance and alcohol abuse following bariatric surgery was discussed with the patient, along with the negative consequences of substance/alcohol use after surgery including addiction, worsening of mental health disorders, and injury to the stomach. The risk of smoking and vaping (tobacco or any other substance) after bariatric surgery was explained to the patient. This includes risk of anastamotic ulcers, gastritis, bleeding, perforation, stricture, and PO intolerance.  The patient expressed understanding and acceptance of these risks.    ***The patient was advised not to become pregnant within 12-18 months following bariatric surgery. She was educated on the increased risks to mother and fetus associated with pregnancy within 2 years of bariatric surgery.    The benefits of the above surgeries including weight loss, improvement/resolution of associated medical and mental  health conditions, improved mobility, and decreased mortality have been explained the the patient and Jesusita Her has expressed understanding and acceptance of them.      PLAN:  The plan of treatment for Jesusita Her is to continue with the consultations and tests ordered today in hopes of qualifying for pre-operative clearance for bariatric surgery. This includes:    Consult Nutrition for education   Consult Psychology  Consult Cardiology  Labs ordered  EGD  PCP for medical optimization  Consult sleep medicine - concern for JOSE  Recommend at least *** lbs of weight loss prior to surgery.  ***        *** minutes were spent with patient including history, physical exam, and education.

## 2024-11-18 ENCOUNTER — TELEPHONE (OUTPATIENT)
Dept: SURGERY | Facility: CLINIC | Age: 34
End: 2024-11-18
Payer: COMMERCIAL

## 2024-11-18 NOTE — TELEPHONE ENCOUNTER
No answer and voicemail was full.  Will send a [employer] message to confirm appointment.

## 2024-11-22 ENCOUNTER — APPOINTMENT (OUTPATIENT)
Dept: SURGERY | Facility: CLINIC | Age: 34
End: 2024-11-22
Payer: COMMERCIAL

## 2025-01-06 ENCOUNTER — APPOINTMENT (OUTPATIENT)
Dept: BEHAVIORAL HEALTH | Facility: CLINIC | Age: 35
End: 2025-01-06
Payer: COMMERCIAL

## 2025-01-10 ENCOUNTER — CLINICAL SUPPORT (OUTPATIENT)
Dept: OBSTETRICS AND GYNECOLOGY | Facility: CLINIC | Age: 35
End: 2025-01-10
Payer: COMMERCIAL

## 2025-01-10 DIAGNOSIS — Z32.01 PREGNANCY TEST POSITIVE (HHS-HCC): Primary | ICD-10-CM

## 2025-01-10 LAB — PREGNANCY TEST URINE, POC: POSITIVE

## 2025-01-10 PROCEDURE — 81025 URINE PREGNANCY TEST: CPT

## 2025-01-10 PROCEDURE — 81025 URINE PREGNANCY TEST: CPT | Performed by: OBSTETRICS & GYNECOLOGY

## 2025-01-10 NOTE — PROGRESS NOTES
Patient was here for a pregnancy test had a positive test was advise to make a new ob appointment patient states that she is not sure if she will be going through with this pregnancy.

## 2025-02-21 ENCOUNTER — OFFICE VISIT (OUTPATIENT)
Dept: OBSTETRICS AND GYNECOLOGY | Facility: CLINIC | Age: 35
End: 2025-02-21
Payer: COMMERCIAL

## 2025-02-21 VITALS
SYSTOLIC BLOOD PRESSURE: 118 MMHG | DIASTOLIC BLOOD PRESSURE: 80 MMHG | WEIGHT: 261 LBS | HEART RATE: 105 BPM | BODY MASS INDEX: 42.13 KG/M2

## 2025-02-21 DIAGNOSIS — N89.8 VAGINAL IRRITATION: ICD-10-CM

## 2025-02-21 DIAGNOSIS — Z32.01 POSITIVE PREGNANCY TEST (HHS-HCC): ICD-10-CM

## 2025-02-21 DIAGNOSIS — Z11.3 SCREENING EXAMINATION FOR STI: Primary | ICD-10-CM

## 2025-02-21 DIAGNOSIS — K64.0 GRADE I HEMORRHOIDS: ICD-10-CM

## 2025-02-21 DIAGNOSIS — Z30.09 EMERGENCY CONTRACEPTIVE COUNSELING: ICD-10-CM

## 2025-02-21 LAB — PREGNANCY TEST URINE, POC: POSITIVE

## 2025-02-21 PROCEDURE — 99214 OFFICE O/P EST MOD 30 MIN: CPT | Performed by: STUDENT IN AN ORGANIZED HEALTH CARE EDUCATION/TRAINING PROGRAM

## 2025-02-21 PROCEDURE — 81025 URINE PREGNANCY TEST: CPT | Performed by: STUDENT IN AN ORGANIZED HEALTH CARE EDUCATION/TRAINING PROGRAM

## 2025-02-21 RX ORDER — AMOXICILLIN 250 MG
1 CAPSULE ORAL 2 TIMES DAILY
Qty: 180 TABLET | Refills: 3 | Status: SHIPPED | OUTPATIENT
Start: 2025-02-21 | End: 2026-02-21

## 2025-02-21 RX ORDER — LEVONORGESTREL 1.5 MG/1
1.5 TABLET ORAL ONCE
Qty: 1 TABLET | Refills: 3 | Status: SHIPPED | OUTPATIENT
Start: 2025-02-21 | End: 2025-02-21

## 2025-02-21 RX ORDER — LEVONORGESTREL 1.5 MG/1
1.5 TABLET ORAL ONCE
Qty: 1 TABLET | Refills: 3 | Status: SHIPPED | OUTPATIENT
Start: 2025-02-21 | End: 2025-02-21 | Stop reason: WASHOUT

## 2025-02-21 ASSESSMENT — ENCOUNTER SYMPTOMS
NEUROLOGICAL NEGATIVE: 0
GASTROINTESTINAL NEGATIVE: 0
RESPIRATORY NEGATIVE: 0
CONSTITUTIONAL NEGATIVE: 0
ALLERGIC/IMMUNOLOGIC NEGATIVE: 0
ENDOCRINE NEGATIVE: 0
CARDIOVASCULAR NEGATIVE: 0
HEMATOLOGIC/LYMPHATIC NEGATIVE: 0
EYES NEGATIVE: 0
PSYCHIATRIC NEGATIVE: 0
MUSCULOSKELETAL NEGATIVE: 0

## 2025-02-21 ASSESSMENT — PAIN SCALES - GENERAL: PAINLEVEL_OUTOF10: 0-NO PAIN

## 2025-02-21 NOTE — ASSESSMENT & PLAN NOTE
Exam with hemorrhoid, she reports irritation  Discussed bowel regimen and conservative management  Rx for PeriColace and Preparation H

## 2025-02-21 NOTE — ASSESSMENT & PLAN NOTE
UPT positive today, only 25 days since TAB - do NOT think that she is pregnant at this time  Expect at least 30-38 days for HCG to return to negative - encouraged patient to take home UPT in 2 weeks

## 2025-02-21 NOTE — ASSESSMENT & PLAN NOTE
Reviewed decreased efficacy of emergency contraception with BMI >30   Reviewed that Cu IUD is only emergency contraceptive option with maintained effectiveness  Still desires Rx for Plan B - sent  Handouts provided for contraception options including paragard info  Will implement plan at annual exam   Impaired Gait

## 2025-02-21 NOTE — PROGRESS NOTES
Subjective   Patient ID: Jesusita Her is a 34 y.o. female who presents for Vaginitis/Bacterial Vaginosis (Pt here for std screening,BV/Lmp-12-6-24/Last pap-3-11-22 /Std-Through urine/Chaperone-accepted).    Reports vaginal discharge (yellow) and irritation for 7 days. Also requests STI screening.    Termination of pregnancy 01/28/2025, started on cOCPs at that time. Today requests pregnancy test, has only had intercourse once.    Interested in a tubal and Rx for emergency contraception. She wants to consider contraception options and decide by annual exam next month.    Personal Hx migraines without aura. Reports PCOS. No other surgeries.    Objective   Physical Exam  Vitals reviewed. Exam conducted with a chaperone present.   Constitutional:       Appearance: Normal appearance.   HENT:      Head: Normocephalic.   Cardiovascular:      Rate and Rhythm: Normal rate and regular rhythm.   Pulmonary:      Effort: Pulmonary effort is normal. No respiratory distress.   Abdominal:      General: There is no distension.      Palpations: Abdomen is soft. There is no mass.      Tenderness: There is no abdominal tenderness. There is no guarding or rebound.   Genitourinary:         Comments: Normal appearing external female genitalia. Vulva without lesions. Vaginal mucosa normal appearing with physiologic discharge and no lesions.     Single non-thrombosed small hemorrhoid at area diagrammed  Skin:     General: Skin is warm and dry.   Neurological:      General: No focal deficit present.      Mental Status: She is alert.   Psychiatric:         Mood and Affect: Mood normal.         Behavior: Behavior normal.         Thought Content: Thought content normal.         Judgment: Judgment normal.         Assessment/Plan   Problem List Items Addressed This Visit             ICD-10-CM    Emergency contraceptive counseling Z30.09     Reviewed decreased efficacy of emergency contraception with BMI >30   Reviewed that Cu IUD is only  emergency contraceptive option with maintained effectiveness  Still desires Rx for Plan B - sent  Handouts provided for contraception options including paragard info  Will implement plan at annual exam         Relevant Medications    levonorgestrel (Plan B One-Step) 1.5 mg tablet    Grade I hemorrhoids K64.0     Exam with hemorrhoid, she reports irritation  Discussed bowel regimen and conservative management  Rx for PeriColace and Preparation H         Relevant Medications    phenyleph-min oil-petrolatum (Preparation H) 0.25-14-74.9 % rectal ointment    sennosides-docusate sodium (Alessandra-Colace) 8.6-50 mg tablet    Vaginal irritation N89.8     Exam benign  Vaginitis swab         Relevant Orders    Vaginitis Gram Stain For Bacterial Vaginosis + Yeast    POCT pregnancy, urine manually resulted    Positive pregnancy test ([facility]) Z32.01     UPT positive today, only 25 days since TAB - do NOT think that she is pregnant at this time  Expect at least 30-38 days for HCG to return to negative - encouraged patient to take home UPT in 2 weeks          Other Visit Diagnoses         Codes    Screening examination for STI    -  Primary Z11.3    Relevant Orders    Syphilis Screen with Reflex    HIV 1/2 Antigen/Antibody Screen with Reflex to Confirmation    Hepatitis C Antibody    Hepatitis B Surface Antigen    Trichomonas vaginalis, Amplified    C. trachomatis / N. gonorrhoeae, Amplified, Urogenital                 Jose Jaffe MD 02/21/25 3:11 PM

## 2025-02-22 LAB
BV SCORE VAG QL: NORMAL
C TRACH RRNA SPEC QL NAA+PROBE: NOT DETECTED
N GONORRHOEA RRNA SPEC QL NAA+PROBE: NOT DETECTED
QUEST GC CT AMPLIFIED (ALWAYS MESSAGE): NORMAL
T VAGINALIS RRNA SPEC QL NAA+PROBE: DETECTED

## 2025-02-24 ENCOUNTER — TELEPHONE (OUTPATIENT)
Dept: OBSTETRICS AND GYNECOLOGY | Facility: CLINIC | Age: 35
End: 2025-02-24
Payer: COMMERCIAL

## 2025-02-24 DIAGNOSIS — A59.9 TRICHOMONIASIS: Primary | ICD-10-CM

## 2025-02-24 DIAGNOSIS — Z30.09 EMERGENCY CONTRACEPTIVE COUNSELING: Primary | ICD-10-CM

## 2025-02-24 RX ORDER — DROSPIRENONE 4 MG/1
4 TABLET, FILM COATED ORAL DAILY
Qty: 90 TABLET | Refills: 3 | Status: SHIPPED | OUTPATIENT
Start: 2025-02-24 | End: 2026-02-24

## 2025-02-24 RX ORDER — METRONIDAZOLE 500 MG/1
500 TABLET ORAL 2 TIMES DAILY
Qty: 14 TABLET | Refills: 0 | Status: SHIPPED | OUTPATIENT
Start: 2025-02-24 | End: 2025-03-03

## 2025-02-24 NOTE — TELEPHONE ENCOUNTER
----- Message from Jose Jaffe sent at 2025  8:13 AM EST -----  Can you please notify patient of positive trichomonas and offer EPT? Thanks!    Called pt, identified by name and   Notified of result, treatment, partner treatment  EPT offered, and sent for partner.  Instructed no unprotected sex x 7 days after treatment  Encouraged use of condoms routinely  No alcohol while taking medication  Pt verbalizes understanding.   Requesting refill of OCP Message sent to Dr Jaffe.

## 2025-03-19 ENCOUNTER — APPOINTMENT (OUTPATIENT)
Dept: OBSTETRICS AND GYNECOLOGY | Facility: CLINIC | Age: 35
End: 2025-03-19

## 2025-03-19 VITALS
BODY MASS INDEX: 42.99 KG/M2 | SYSTOLIC BLOOD PRESSURE: 120 MMHG | HEIGHT: 65 IN | WEIGHT: 258 LBS | DIASTOLIC BLOOD PRESSURE: 70 MMHG

## 2025-03-19 DIAGNOSIS — Z01.419 ENCOUNTER FOR ANNUAL ROUTINE GYNECOLOGICAL EXAMINATION: Primary | ICD-10-CM

## 2025-03-19 DIAGNOSIS — Z11.3 SCREENING FOR STD (SEXUALLY TRANSMITTED DISEASE): ICD-10-CM

## 2025-03-19 LAB — PREGNANCY TEST URINE, POC: NEGATIVE

## 2025-03-19 PROCEDURE — 3008F BODY MASS INDEX DOCD: CPT

## 2025-03-19 PROCEDURE — 99395 PREV VISIT EST AGE 18-39: CPT

## 2025-03-19 PROCEDURE — 81025 URINE PREGNANCY TEST: CPT

## 2025-03-19 RX ORDER — SENNOSIDES 8.6 MG
1 TABLET ORAL
COMMUNITY
Start: 2025-02-21

## 2025-03-19 RX ORDER — NORETHINDRONE ACETATE AND ETHINYL ESTRADIOL 1MG-20(21)
1 KIT ORAL
COMMUNITY
Start: 2025-01-31

## 2025-03-19 ASSESSMENT — ENCOUNTER SYMPTOMS
GASTROINTESTINAL NEGATIVE: 0
NEUROLOGICAL NEGATIVE: 0
EYES NEGATIVE: 0
CONSTITUTIONAL NEGATIVE: 0
ENDOCRINE NEGATIVE: 0
MUSCULOSKELETAL NEGATIVE: 0
ALLERGIC/IMMUNOLOGIC NEGATIVE: 0
PSYCHIATRIC NEGATIVE: 0
CARDIOVASCULAR NEGATIVE: 0
HEMATOLOGIC/LYMPHATIC NEGATIVE: 0
RESPIRATORY NEGATIVE: 0

## 2025-03-19 ASSESSMENT — PAIN SCALES - GENERAL: PAINLEVEL_OUTOF10: 0-NO PAIN

## 2025-03-19 NOTE — PROGRESS NOTES
"Subjective   Jesusita Her is a 34 y.o. female who is here for Annual Exam.     Concerns today:  hasn't had regular period since  in February, utp today negative.   Dysmenorrhea: mild, occurring throughout menses.   Cyclic symptoms include none.      Sexual Activity:  male partners; Patient reports 1 partners in the last 12 months.  Pain with intercourse? No   Loss of desire? No   Able to have an orgasm? yes     History of prior STI: chlamydia and trichomonas  Current contraception: ocps  Last pap: 3/11/2022  History of abnormal Pap smear: yes - ascus in 2018  Family history of uterine or ovarian cancer: no  Last mammogram: no  History of abnormal mammogram: no  Family history of breast cancer: no      OB History    Para Term  AB Living   4 2 2 0 2 2   SAB IAB Ectopic Multiple Live Births   0 2 0 0 2      Objective   /70   Ht 1.651 m (5' 5\")   Wt 117 kg (258 lb)   LMP 2025      General:   Alert and oriented x 3   Heart:  Lungs: Regular rate, rhythm  Clear to auscultation bilaterally   Thyroid: Euthyroid, normal shape and size   Breast: Symmetrical, no skin changes/nipple discharge, redness, tenderness, no masses palpated bilaterally   Abdomen: Soft, non tender   Vulva: EGBUS normal   Vagina: Pink, normal discharge   Cervix: No CMT   Uterus: Normal shape, size   Adnexa: NT bilaterally       Assessment/Plan   Diagnoses and all orders for this visit:  Encounter for annual routine gynecological examination  -     POCT pregnancy, urine manually resulted  Screening for STD (sexually transmitted disease)  -     Trichomonas vaginalis, Amplified; Future  -     C. trachomatis / N. gonorrhoeae, Amplified, Urogenital; Future    All patient questions answered.   Encouraged use of OCPs and barrier methods.   Encouraged to reach out to our office with any questions or concerns.   Encouraged patient to follow up annually and PRN    CODEY Barnett-CARO   "

## 2025-04-14 ENCOUNTER — APPOINTMENT (OUTPATIENT)
Dept: OBSTETRICS AND GYNECOLOGY | Facility: CLINIC | Age: 35
End: 2025-04-14
Payer: COMMERCIAL

## 2025-05-14 ENCOUNTER — HOSPITAL ENCOUNTER (EMERGENCY)
Facility: HOSPITAL | Age: 35
Discharge: HOME | End: 2025-05-14
Payer: COMMERCIAL

## 2025-05-14 VITALS
DIASTOLIC BLOOD PRESSURE: 103 MMHG | TEMPERATURE: 97 F | OXYGEN SATURATION: 99 % | RESPIRATION RATE: 18 BRPM | SYSTOLIC BLOOD PRESSURE: 142 MMHG | HEART RATE: 112 BPM

## 2025-05-14 DIAGNOSIS — B02.9 HERPES ZOSTER WITHOUT COMPLICATION: Primary | ICD-10-CM

## 2025-05-14 LAB — PREGNANCY TEST URINE, POC: NEGATIVE

## 2025-05-14 PROCEDURE — 99284 EMERGENCY DEPT VISIT MOD MDM: CPT | Performed by: NURSE PRACTITIONER

## 2025-05-14 PROCEDURE — 81025 URINE PREGNANCY TEST: CPT | Performed by: NURSE PRACTITIONER

## 2025-05-14 PROCEDURE — 99283 EMERGENCY DEPT VISIT LOW MDM: CPT

## 2025-05-14 RX ORDER — VALACYCLOVIR HYDROCHLORIDE 1 G/1
1000 TABLET, FILM COATED ORAL 3 TIMES DAILY
Qty: 30 TABLET | Refills: 0 | Status: SHIPPED | OUTPATIENT
Start: 2025-05-14 | End: 2025-05-24

## 2025-05-14 ASSESSMENT — COLUMBIA-SUICIDE SEVERITY RATING SCALE - C-SSRS
1. IN THE PAST MONTH, HAVE YOU WISHED YOU WERE DEAD OR WISHED YOU COULD GO TO SLEEP AND NOT WAKE UP?: NO
2. HAVE YOU ACTUALLY HAD ANY THOUGHTS OF KILLING YOURSELF?: NO
6. HAVE YOU EVER DONE ANYTHING, STARTED TO DO ANYTHING, OR PREPARED TO DO ANYTHING TO END YOUR LIFE?: NO

## 2025-05-14 ASSESSMENT — PAIN SCALES - GENERAL: PAINLEVEL_OUTOF10: 3

## 2025-05-14 ASSESSMENT — PAIN DESCRIPTION - LOCATION: LOCATION: RIB CAGE

## 2025-05-14 ASSESSMENT — PAIN DESCRIPTION - DESCRIPTORS: DESCRIPTORS: BURNING

## 2025-05-14 ASSESSMENT — PAIN DESCRIPTION - ORIENTATION: ORIENTATION: LEFT

## 2025-05-14 ASSESSMENT — PAIN - FUNCTIONAL ASSESSMENT: PAIN_FUNCTIONAL_ASSESSMENT: 0-10

## 2025-05-14 ASSESSMENT — PAIN DESCRIPTION - PAIN TYPE: TYPE: ACUTE PAIN

## 2025-05-14 NOTE — ED TRIAGE NOTES
Pt presented with c/o burning under left breast, pt states there is tenderness when she touches it, and itching pt denies CP and SOB

## 2025-05-14 NOTE — ED PROVIDER NOTES
Emergency Department Encounter  [facility] EMERGENCY MEDICINE    Patient: Jesusita Her  MRN: [id number]  : 1990  Date of Evaluation: 2025  ED Provider: ALANA Pascual      Chief Complaint       Chief Complaint   Patient presents with    Skin Problem     Huslia       Limitations to History: None   Historian: Patient  Records reviewed: EMR inpatient and outpatient notes, Care Everywhere    Jesusita Her is a 34 y.o. female who presents to the emergency department complaining of burning pain under the left breast that wraps around to her back.  It is just on the left side and follows the single dermatome.  She has had the burning pain for approximately 2 to 3 days.  She states that the area is tender to light touch.  She has had no injury and no trauma no bruising and has not noticed a rash.  She denies any chest pain or shortness of breath.  She does have an underlying headache that she rates at 3 out of 10 for around the same time.  She has not taken any medications for the pain as the headache is not bad.  She does have chronic headaches and sees a neurologist she has a prescription for Imitrex but states that she has not had to use it yet.  Patient states her last menstrual period was .  She did have a miscarriage earlier in the month.  Patient states that her body has been under a lot of stress recently with everything it has gone through.    ROS:     Review of Systems  All other systems have been reviewed and are otherwise acutely negative except as in the Huslia.    Past History   Medical History[1]  Surgical History[2]    Medications/Allergies     Previous Medications    BACITRACIN 500 UNIT/GRAM OINTMENT    Apply topically 2 times a day.    BLISOVI FE , 28, 1 MG-20 MCG (21)/75 MG (7) TABLET    Take 1 tablet by mouth early in the morning..    DROSPIRENONE, CONTRACEPTIVE, (SLYND) 4 MG (28) TABLET    Take 1 tablet by mouth once daily.    IBUPROFEN (MOTRIN)  CAPSULE    Take 1 capsule (200 mg) by mouth every 6 hours if needed (menstrual cramps.).    LEVONORGESTREL (PLAN B ONE-STEP) 1.5 MG TABLET    Take 1 tablet (1.5 mg) by mouth 1 time for 1 dose.    METOPROLOL TARTRATE (LOPRESSOR) 25 MG TABLET    Take 1 tablet (25 mg) by mouth once daily.    PHENYLEPH-MIN OIL-PETROLATUM (PREPARATION H) 0.25-14-74.9 % RECTAL OINTMENT    Insert into the rectum 4 times a day as needed for hemorrhoids.    SENNA 8.6 MG TABLET    Take 1 tablet (8.6 mg) by mouth every 12 hours.    SENNOSIDES-DOCUSATE SODIUM (GENET-COLACE) 8.6-50 MG TABLET    Take 1 tablet by mouth 2 times a day.    SUMATRIPTAN (IMITREX) 100 MG TABLET    Take 1 tablet (100 mg) by mouth 1 time if needed for migraine.     Allergies[3]     Physical Exam       ED Triage Vitals [05/14/25 0832]   Temperature Heart Rate Respirations BP   36.1 °C (97 °F) (!) 112 18 (!) 142/103      Pulse Ox Temp src Heart Rate Source Patient Position   99 % -- -- --      BP Location FiO2 (%)     -- --         Physical Exam    GENERAL:  The patient appears nourished and normally developed. Vital signs as documented.     HEENT:  Head normocephalic, atraumatic, EOMs intact, PERRLA, Mucous membranes moist. Nares patent without copious rhinorrhea.  No lymphadenopathy.    PULMONARY:  Lungs are clear to auscultation, without any respiratory distress. Able to speak full sentences, no accessory muscle use    CARDIAC:   Normal rate. No murmurs, rubs or gallops    MUSCULOSKELETAL:   No tenderness of cervical, thoracic and lumbar spine, no step off or deformity noted,  Able to ambulate, Non edematous, with no obvious deformities    SKIN:   Good color, with no significant rashes on exposed skin skin is tender to light touch mid thoracic around under to the breast    NEURO:  No obvious neurological deficits, normal sensation and strength bilaterally.  Able to follow commands, CN 2-12 grossly intact.        Assessment   In brief, Jesusita Her is a 34 y.o. female  who presented to the emergency department for burning pain in the left breast.  The pain follows the dermatome.  She has tingling and burning sensation however does not have a rash.  I have discussed with her that I am concerned that she may have shingles as typically you have burning and pain prior to developing the rash.  I have discussed with her starting antiviral medications.  A pregnancy test was obtained and this is negative.  She is agreeable to this plan of care.  She may continue with Motrin and Tylenol for any body aches or fever will increase her fluids for hydration and will follow-up with her family physician on Monday or Tuesday.  For any worsening symptoms or concerns she may return to the emergency department      ED Course     Diagnoses as of 05/14/25 0902   Herpes zoster without complication       Visit Vitals  BP (!) 142/103   Pulse (!) 112   Temp 36.1 °C (97 °F)   Resp 18   SpO2 99%   OB Status Having periods   Smoking Status Every Day       Plan of care discussed, patient verbalizes understanding of plan of care and is in agreement.      Final Impression      1. Herpes zoster without complication          DISPOSITION  Disposition: Discharge  Patient condition is: Stable    Comment: Please note this report has been produced using speech recognition software and may contain errors related to that system including errors in grammar, punctuation, and spelling, as well as words and phrases that may be inappropriate.  If there are any questions or concerns please feel free to contact the dictating provider for clarification.    CODEY Pascual-CNP       [1]   Past Medical History:  Diagnosis Date    Elevated blood-pressure reading, without diagnosis of hypertension 11/24/2021    Elevated blood pressure reading    Encounter for screening for infections with a predominantly sexual mode of transmission 08/07/2020    Routine screening for STI (sexually transmitted infection)    Encounter for  screening for malignant neoplasm of cervix     Screening for cervical cancer    Furuncle, unspecified 2022    Boil    Hypertrophy of tonsils with hypertrophy of adenoids 2018    Tonsillar and adenoid hypertrophy    Irregular menstruation, unspecified 2020    Missed menses    Migraine, unspecified, not intractable, without status migrainosus 2020    Migraines    Nicotine dependence, cigarettes, uncomplicated 2021    Cigarette nicotine dependence without complication    Personal history of other diseases of the circulatory system     History of hypertension    Personal history of other diseases of the female genital tract 2020    History of amenorrhea    Personal history of other diseases of the respiratory system 2019    History of tonsillitis    Personal history of other infectious and parasitic diseases 2018    History of trichomoniasis    Personal history of other infectious and parasitic diseases     History of chlamydia infection    Personal history of other medical treatment     History of positive purified protein derivative test    Personal history of other specified conditions     History of snoring    Personal history of other specified conditions 2020    History of dysuria    Procedure and treatment not carried out for other reasons 2020    Attempted IUD removal, unsuccessful    Unspecified complication of genitourinary prosthetic device, implant and graft, initial encounter 2020    IUD complication   [2]   Past Surgical History:  Procedure Laterality Date    OTHER SURGICAL HISTORY  2018    Surgical Treatment For    [3]   Allergies  Allergen Reactions    Penicillin Brees        CODEY Pascual-ALMA ROSA  [id number]

## 2025-05-23 ENCOUNTER — APPOINTMENT (OUTPATIENT)
Dept: OBSTETRICS AND GYNECOLOGY | Facility: CLINIC | Age: 35
End: 2025-05-23
Payer: COMMERCIAL

## 2025-05-23 ENCOUNTER — OFFICE VISIT (OUTPATIENT)
Dept: PRIMARY CARE | Facility: CLINIC | Age: 35
End: 2025-05-23
Payer: COMMERCIAL

## 2025-05-23 VITALS
WEIGHT: 255 LBS | TEMPERATURE: 99 F | BODY MASS INDEX: 42.43 KG/M2 | SYSTOLIC BLOOD PRESSURE: 134 MMHG | DIASTOLIC BLOOD PRESSURE: 92 MMHG

## 2025-05-23 DIAGNOSIS — G43.001 MIGRAINE WITHOUT AURA AND WITH STATUS MIGRAINOSUS, NOT INTRACTABLE: ICD-10-CM

## 2025-05-23 DIAGNOSIS — L73.2 HIDRADENITIS SUPPURATIVA OF RIGHT AXILLA: ICD-10-CM

## 2025-05-23 DIAGNOSIS — I10 PRIMARY HYPERTENSION: Primary | ICD-10-CM

## 2025-05-23 PROCEDURE — 3075F SYST BP GE 130 - 139MM HG: CPT | Performed by: INTERNAL MEDICINE

## 2025-05-23 PROCEDURE — 3080F DIAST BP >= 90 MM HG: CPT | Performed by: INTERNAL MEDICINE

## 2025-05-23 PROCEDURE — 99214 OFFICE O/P EST MOD 30 MIN: CPT | Performed by: INTERNAL MEDICINE

## 2025-05-23 RX ORDER — METOPROLOL SUCCINATE 25 MG/1
25 TABLET, EXTENDED RELEASE ORAL DAILY
Qty: 30 TABLET | Refills: 5 | Status: SHIPPED | OUTPATIENT
Start: 2025-05-23 | End: 2025-11-19

## 2025-05-23 ASSESSMENT — ENCOUNTER SYMPTOMS
PALPITATIONS: 0
FREQUENCY: 0
SORE THROAT: 0
BLOOD IN STOOL: 0
MYALGIAS: 0
CONFUSION: 0
NECK PAIN: 0
FATIGUE: 0
DYSURIA: 0
SINUS PAIN: 0
DIARRHEA: 0
HEADACHES: 0
CHILLS: 0
DIZZINESS: 0
ARTHRALGIAS: 0
FEVER: 0
BACK PAIN: 0
NERVOUS/ANXIOUS: 0
COUGH: 0
CONSTIPATION: 0
WEAKNESS: 0
ABDOMINAL PAIN: 0
SHORTNESS OF BREATH: 0

## 2025-05-23 NOTE — PROGRESS NOTES
Subjective   Patient ID: Jesusita Her is a 34 y.o. female who presents for Follow-up (Pt present today for knot on back. ls).    HPI Ms. Phillips is a 34-year-old female seen in office today for follow-up.  She has hypertension but has not been taking her metoprolol.  She has chronic migraine.  She takes sumatriptan but experiences side effects so she has not been taking it regularly.  She has chronic hidradenitis in her right axilla.  She complains of pain in her upper back on the left side and a specific area which is extremely tender.  She would like to get it checked.  She denies any rash or redness.  She is concerned about her weight.  She states that she does not exercise much.  She tries to eat a healthy diet.  She is also seen by GYN regularly for recent missed.,  Positive pregnancy, chlamydia infection.    Review of Systems   Constitutional:  Negative for chills, fatigue and fever.   HENT:  Negative for congestion, sinus pain and sore throat.    Respiratory:  Negative for cough and shortness of breath.    Cardiovascular:  Negative for chest pain, palpitations and leg swelling.   Gastrointestinal:  Negative for abdominal pain, blood in stool, constipation and diarrhea.   Genitourinary:  Negative for dysuria and frequency.   Musculoskeletal:  Negative for arthralgias, back pain, myalgias and neck pain.   Skin:         Lesion on the back   Neurological:  Negative for dizziness, weakness and headaches.   Psychiatric/Behavioral:  Negative for confusion. The patient is not nervous/anxious.        Objective   BP (!) 134/92   Temp 37.2 °C (99 °F)   Wt 116 kg (255 lb)   BMI 42.43 kg/m²     Physical Exam  Vitals reviewed.   Constitutional:       General: She is not in acute distress.     Appearance: Normal appearance.   HENT:      Mouth/Throat:      Mouth: Mucous membranes are moist.   Cardiovascular:      Rate and Rhythm: Normal rate and regular rhythm.      Pulses: Normal pulses.   Pulmonary:      Effort:  Pulmonary effort is normal. No respiratory distress.      Breath sounds: Normal breath sounds.   Abdominal:      General: Bowel sounds are normal. There is no distension.      Tenderness: There is no abdominal tenderness.   Musculoskeletal:         General: No swelling or tenderness. Normal range of motion.   Skin:     General: Skin is warm.   Neurological:      General: No focal deficit present.      Mental Status: She is alert.      Coordination: Coordination normal.      Gait: Gait normal.   Psychiatric:         Mood and Affect: Mood normal.         Behavior: Behavior normal.         Assessment/Plan   Diagnoses and all orders for this visit:  Primary hypertension  Comments:  Blood pressure is elevated  Restart metoprolol  Orders:  -     metoprolol succinate XL (Toprol-XL) 25 mg 24 hr tablet; Take 1 tablet (25 mg) by mouth once daily. Do not crush or chew.  Migraine without aura and with status migrainosus, not intractable  Comments:  Side effects from sumatriptan  Start Ubrelvy 100 mg as needed for migraine headache  Orders:  -     ubrogepant (Ubrelvy) 100 mg tablet; Take 1 tablet (100 mg) by mouth 1 time for 1 dose.  Hidradenitis suppurativa of right axilla  Comments:  Follow-up with dermatology-referral provided  Orders:  -     Referral to Dermatology  BMI 40.0-44.9, adult (Multi)  Comments:  Advised her to eat healthy, exercise regularly and monitor her weight  Follow-up in 3 to 4 months for weight management  GYN [id number] with OB/GYN

## 2025-05-29 ENCOUNTER — APPOINTMENT (OUTPATIENT)
Dept: OBSTETRICS AND GYNECOLOGY | Facility: CLINIC | Age: 35
End: 2025-05-29
Payer: COMMERCIAL

## 2025-05-29 VITALS
HEIGHT: 65 IN | SYSTOLIC BLOOD PRESSURE: 118 MMHG | BODY MASS INDEX: 42.95 KG/M2 | DIASTOLIC BLOOD PRESSURE: 76 MMHG | WEIGHT: 257.8 LBS

## 2025-05-29 DIAGNOSIS — Z30.430 ENCOUNTER FOR IUD INSERTION: ICD-10-CM

## 2025-05-29 DIAGNOSIS — Z11.3 SCREENING EXAMINATION FOR STI: Primary | ICD-10-CM

## 2025-05-29 PROCEDURE — 58300 INSERT INTRAUTERINE DEVICE: CPT | Performed by: OBSTETRICS & GYNECOLOGY

## 2025-05-29 ASSESSMENT — ENCOUNTER SYMPTOMS
NEUROLOGICAL NEGATIVE: 0
MUSCULOSKELETAL NEGATIVE: 0
CONSTITUTIONAL NEGATIVE: 0
CARDIOVASCULAR NEGATIVE: 0
ENDOCRINE NEGATIVE: 0
RESPIRATORY NEGATIVE: 0
GASTROINTESTINAL NEGATIVE: 0
HEMATOLOGIC/LYMPHATIC NEGATIVE: 0
PSYCHIATRIC NEGATIVE: 0
ALLERGIC/IMMUNOLOGIC NEGATIVE: 0
EYES NEGATIVE: 0

## 2025-05-29 ASSESSMENT — PAIN SCALES - GENERAL: PAINLEVEL_OUTOF10: 0-NO PAIN

## 2025-05-29 NOTE — PROGRESS NOTES
Patient ID: Jesusita Her is a 34 y.o. female.  Here for a Procedure    History of Present Illness  Jesusita Her is a 34 year old female who presents for IUD placement.    She has been using birth control pills since February 2025 but stopped after a suspected miscarriage following a positive home pregnancy test. Subsequent clinic testing was negative. She is currently menstruating and has not had intercourse for over two weeks.    She previously used the Mirena IUD for several years and feels that hormonal contraceptives worsen her PCOS symptoms and migraines.    She is experiencing a flare-up of hidradenitis suppurativa with clusters in the vaginal area and has a dermatology appointment in two months.    She has a history of STDs in relationships, leading to tension with her current partner over a trichomoniasis diagnosis.       Visit Vitals  /76 (BP Location: Right arm)        IUD Management    Performed by: Corinne A Bazella, MD  Authorized by: Corinne A Bazella, MD    Procedure: IUD insertion    Consent obtained by patient, parent, or legal power of  - including discussion of procedure risks and benefits, patient questions answered, and patient education provided: yes    Pregnancy risk: reasonably certain the patient is not pregnant    Date/Time of Insertion:  5/29/2025 8:54 AM  Immediately prior to procedure a time out was called: yes    Pelvic exam performed: yes    Speculum placed in vagina: yes    Cervix cleaned and prepped: yes    Tenaculum/Allis/Ring Forceps applied to cervix: yes    Anesthesia used: yes    Local anesthesia:  Intracervical  Uterus sound depth (cm):  8  Cervix manually dilated: no    IUD inserted without complications: yes    OSM: levonorgestrel 20 mcg/24hr  Strings trimmed to (cm):  4  Patient tolerated procedure well: yes    Intended removal date: 8 years        OBGyn Exam

## 2025-05-30 LAB
C TRACH RRNA SPEC QL NAA+PROBE: NOT DETECTED
N GONORRHOEA RRNA SPEC QL NAA+PROBE: NOT DETECTED
QUEST GC CT AMPLIFIED (ALWAYS MESSAGE): NORMAL

## 2025-06-02 ENCOUNTER — APPOINTMENT (OUTPATIENT)
Dept: PRIMARY CARE | Facility: CLINIC | Age: 35
End: 2025-06-02
Payer: COMMERCIAL

## 2025-07-22 ENCOUNTER — APPOINTMENT (OUTPATIENT)
Dept: DERMATOLOGY | Facility: CLINIC | Age: 35
End: 2025-07-22
Payer: COMMERCIAL

## 2025-08-04 ENCOUNTER — APPOINTMENT (OUTPATIENT)
Dept: PRIMARY CARE | Facility: CLINIC | Age: 35
End: 2025-08-04
Payer: COMMERCIAL

## 2025-08-19 ENCOUNTER — APPOINTMENT (OUTPATIENT)
Dept: PRIMARY CARE | Facility: CLINIC | Age: 35
End: 2025-08-19
Payer: COMMERCIAL

## 2025-08-25 ENCOUNTER — APPOINTMENT (OUTPATIENT)
Dept: PRIMARY CARE | Facility: CLINIC | Age: 35
End: 2025-08-25
Payer: COMMERCIAL

## 2025-09-15 ENCOUNTER — APPOINTMENT (OUTPATIENT)
Dept: PRIMARY CARE | Facility: CLINIC | Age: 35
End: 2025-09-15
Payer: COMMERCIAL

## 2025-10-06 ENCOUNTER — APPOINTMENT (OUTPATIENT)
Dept: DERMATOLOGY | Facility: CLINIC | Age: 35
End: 2025-10-06
Payer: COMMERCIAL

## 2025-11-19 ENCOUNTER — APPOINTMENT (OUTPATIENT)
Dept: DERMATOLOGY | Facility: CLINIC | Age: 35
End: 2025-11-19
Payer: COMMERCIAL